# Patient Record
Sex: FEMALE | Race: WHITE | URBAN - METROPOLITAN AREA
[De-identification: names, ages, dates, MRNs, and addresses within clinical notes are randomized per-mention and may not be internally consistent; named-entity substitution may affect disease eponyms.]

---

## 2024-02-09 ENCOUNTER — OFFICE VISIT (OUTPATIENT)
Dept: FAMILY MEDICINE CLINIC | Facility: CLINIC | Age: 59
End: 2024-02-09
Payer: COMMERCIAL

## 2024-02-09 VITALS
DIASTOLIC BLOOD PRESSURE: 84 MMHG | HEART RATE: 81 BPM | OXYGEN SATURATION: 96 % | SYSTOLIC BLOOD PRESSURE: 120 MMHG | HEIGHT: 62 IN | BODY MASS INDEX: 29.44 KG/M2 | WEIGHT: 160 LBS | RESPIRATION RATE: 16 BRPM

## 2024-02-09 DIAGNOSIS — S62.605D CLOSED NONDISPLACED FRACTURE OF PHALANX OF LEFT RING FINGER WITH ROUTINE HEALING, UNSPECIFIED PHALANX, SUBSEQUENT ENCOUNTER: Primary | ICD-10-CM

## 2024-02-09 RX ORDER — CYCLOBENZAPRINE HCL 10 MG
1 TABLET ORAL NIGHTLY
COMMUNITY

## 2024-02-09 RX ORDER — NABUMETONE 750 MG/1
750 TABLET, FILM COATED ORAL 2 TIMES DAILY WITH MEALS
COMMUNITY
Start: 2024-02-02

## 2024-02-09 RX ORDER — ACETAMINOPHEN AND CODEINE PHOSPHATE 300; 30 MG/1; MG/1
1 TABLET ORAL EVERY 4 HOURS PRN
Qty: 30 TABLET | Refills: 0 | Status: SHIPPED | OUTPATIENT
Start: 2024-02-09

## 2024-02-09 RX ORDER — VALACYCLOVIR HYDROCHLORIDE 500 MG/1
TABLET, FILM COATED ORAL
COMMUNITY
Start: 2023-10-03

## 2024-02-09 RX ORDER — IBUPROFEN 800 MG/1
800 TABLET ORAL
COMMUNITY

## 2024-02-09 RX ORDER — LOPERAMIDE HYDROCHLORIDE 2 MG/1
2 CAPSULE ORAL EVERY 2 HOUR PRN
COMMUNITY
Start: 2022-10-19

## 2024-02-09 RX ORDER — MULTIVIT WITH MINERALS/LUTEIN
1000 TABLET ORAL
COMMUNITY

## 2024-02-09 RX ORDER — HYDROCODONE BITARTRATE AND ACETAMINOPHEN 5; 325 MG/1; MG/1
1 TABLET ORAL
COMMUNITY
Start: 2022-07-08

## 2024-02-09 RX ORDER — GABAPENTIN 100 MG/1
CAPSULE ORAL
COMMUNITY
Start: 2024-01-30

## 2024-02-09 RX ORDER — ASCORBIC ACID 500 MG
500 TABLET ORAL 3 TIMES DAILY
COMMUNITY

## 2024-02-09 NOTE — PROGRESS NOTES
Subjective:   Patient ID: Augustina Baugh is a 58 year old female.    HPI  Patient who is new right handed and to practice is brought in by her son for follow up from   Was seen last Friday for finger injury  States her left ring finger was caught up in the dog collar  Seemed like it got dislocated and she was able to pop it back in to place  Went to  and had xray done, no records available in care everywhere  States the xray showed fracture that may require surgery  She was placed in a splint and rx'd nabumetone  She is having 8/10 pain now  Also c/o bruising to the hand and 3 fingers  She has swelling of the lateral hand  She is getting numbness of the ring finger  She can wiggle the fingers slightly but cannot bend them without a lot of pain  Taking tylenol and nabumetone with little relief  Also applying ice    History/Other:   Review of Systems   Constitutional:  Negative for chills, diaphoresis and fever.   Musculoskeletal:  Positive for arthralgias (left ring finger injury, pain, bruising) and joint swelling.   Neurological:  Positive for numbness.     Current Outpatient Medications   Medication Sig Dispense Refill    gabapentin 100 MG Oral Cap       nabumetone 750 MG Oral Tab Take 1 tablet (750 mg total) by mouth 2 (two) times daily with meals.      ascorbic acid 500 MG Oral Tab Take 1 tablet (500 mg total) by mouth 3 (three) times daily.      HYDROcodone-acetaminophen 5-325 MG Oral Tab Take 1 tablet by mouth.      diclofenac 50 MG Oral Tab EC TK 1 T PO BID. SWALLOW WHOLE.      cyclobenzaprine 10 MG Oral Tab Take 1 tablet (10 mg total) by mouth nightly.      ibuprofen 800 MG Oral Tab Take 1 tablet (800 mg total) by mouth.      loperamide 2 MG Oral Cap Take 1 capsule (2 mg total) by mouth every 2 (two) hours as needed.      vitamin E 1000 UNITS Oral Cap Take 1 capsule (1,000 Units total) by mouth.      valACYclovir 500 MG Oral Tab  (Patient not taking: Reported on 2/9/2024)       Allergies:No Known  Allergies    Objective:   Physical Exam  Vitals and nursing note reviewed.   Musculoskeletal:      Left wrist: Normal.      Left hand: Swelling, tenderness and bony tenderness (left 4th and 5th metacarpals) present. Decreased range of motion. Decreased strength of finger abduction and thumb/finger opposition. Decreased sensation (left ring finger). There is disruption of two-point discrimination. Normal capillary refill. Normal pulse.         Assessment & Plan:   1. Closed nondisplaced fracture of phalanx of left ring finger with routine healing, unspecified phalanx, subsequent encounter  Referral to ortho for further evaluation and management. Continue RICE and medications for pain control.   - Ortho Referral - In Network  - acetaminophen-codeine (TYLENOL WITH CODEINE #3) 300-30 MG Oral Tab; Take 1 tablet by mouth every 4 (four) hours as needed for Pain.  Dispense: 30 tablet; Refill: 0

## 2024-02-22 ENCOUNTER — LAB ENCOUNTER (OUTPATIENT)
Dept: LAB | Age: 59
End: 2024-02-22
Attending: PHYSICIAN ASSISTANT
Payer: COMMERCIAL

## 2024-02-22 ENCOUNTER — OFFICE VISIT (OUTPATIENT)
Dept: FAMILY MEDICINE CLINIC | Facility: CLINIC | Age: 59
End: 2024-02-22
Payer: COMMERCIAL

## 2024-02-22 VITALS
SYSTOLIC BLOOD PRESSURE: 126 MMHG | BODY MASS INDEX: 29.63 KG/M2 | RESPIRATION RATE: 16 BRPM | DIASTOLIC BLOOD PRESSURE: 78 MMHG | WEIGHT: 161 LBS | HEIGHT: 62 IN | HEART RATE: 80 BPM

## 2024-02-22 DIAGNOSIS — Z78.0 ENCOUNTER FOR OSTEOPOROSIS SCREENING IN ASYMPTOMATIC POSTMENOPAUSAL PATIENT: ICD-10-CM

## 2024-02-22 DIAGNOSIS — Z12.31 VISIT FOR SCREENING MAMMOGRAM: ICD-10-CM

## 2024-02-22 DIAGNOSIS — Z00.00 ROUTINE GENERAL MEDICAL EXAMINATION AT A HEALTH CARE FACILITY: Primary | ICD-10-CM

## 2024-02-22 DIAGNOSIS — Z00.00 ROUTINE GENERAL MEDICAL EXAMINATION AT A HEALTH CARE FACILITY: ICD-10-CM

## 2024-02-22 DIAGNOSIS — C53.9 MALIGNANT NEOPLASM OF CERVIX, UNSPECIFIED SITE (HCC): ICD-10-CM

## 2024-02-22 DIAGNOSIS — Z23 NEED FOR VACCINATION: ICD-10-CM

## 2024-02-22 DIAGNOSIS — Z13.820 ENCOUNTER FOR OSTEOPOROSIS SCREENING IN ASYMPTOMATIC POSTMENOPAUSAL PATIENT: ICD-10-CM

## 2024-02-22 DIAGNOSIS — S62.605D CLOSED NONDISPLACED FRACTURE OF PHALANX OF LEFT RING FINGER WITH ROUTINE HEALING, UNSPECIFIED PHALANX, SUBSEQUENT ENCOUNTER: ICD-10-CM

## 2024-02-22 DIAGNOSIS — C53.0 MALIGNANT NEOPLASM OF ENDOCERVIX (HCC): ICD-10-CM

## 2024-02-22 DIAGNOSIS — Z12.11 COLON CANCER SCREENING: ICD-10-CM

## 2024-02-22 PROBLEM — B00.9 HSV INFECTION: Status: ACTIVE | Noted: 2023-10-20

## 2024-02-22 PROBLEM — Z90.710 STATUS POST TOTAL ABDOMINAL HYSTERECTOMY AND BILATERAL SALPINGO-OOPHORECTOMY (TAH-BSO): Status: ACTIVE | Noted: 2024-02-22

## 2024-02-22 PROBLEM — Z90.722 STATUS POST TOTAL ABDOMINAL HYSTERECTOMY AND BILATERAL SALPINGO-OOPHORECTOMY (TAH-BSO): Status: ACTIVE | Noted: 2024-02-22

## 2024-02-22 PROBLEM — G89.3 CANCER ASSOCIATED PAIN: Status: ACTIVE | Noted: 2022-07-11

## 2024-02-22 PROBLEM — Z90.79 STATUS POST TOTAL ABDOMINAL HYSTERECTOMY AND BILATERAL SALPINGO-OOPHORECTOMY (TAH-BSO): Status: ACTIVE | Noted: 2024-02-22

## 2024-02-22 LAB
ALBUMIN SERPL-MCNC: 3.8 G/DL (ref 3.4–5)
ALBUMIN/GLOB SERPL: 1.3 {RATIO} (ref 1–2)
ALP LIVER SERPL-CCNC: 100 U/L
ALT SERPL-CCNC: 18 U/L
ANION GAP SERPL CALC-SCNC: 7 MMOL/L (ref 0–18)
AST SERPL-CCNC: 13 U/L (ref 15–37)
BASOPHILS # BLD AUTO: 0.02 X10(3) UL (ref 0–0.2)
BASOPHILS NFR BLD AUTO: 0.6 %
BILIRUB SERPL-MCNC: 0.5 MG/DL (ref 0.1–2)
BUN BLD-MCNC: 19 MG/DL (ref 9–23)
CALCIUM BLD-MCNC: 9.2 MG/DL (ref 8.5–10.1)
CHLORIDE SERPL-SCNC: 109 MMOL/L (ref 98–112)
CHOLEST SERPL-MCNC: 190 MG/DL (ref ?–200)
CO2 SERPL-SCNC: 23 MMOL/L (ref 21–32)
CREAT BLD-MCNC: 0.77 MG/DL
EGFRCR SERPLBLD CKD-EPI 2021: 88 ML/MIN/1.73M2 (ref 60–?)
EOSINOPHIL # BLD AUTO: 0.1 X10(3) UL (ref 0–0.7)
EOSINOPHIL NFR BLD AUTO: 3.1 %
ERYTHROCYTE [DISTWIDTH] IN BLOOD BY AUTOMATED COUNT: 13.6 %
FASTING PATIENT LIPID ANSWER: YES
FASTING STATUS PATIENT QL REPORTED: YES
GLOBULIN PLAS-MCNC: 2.9 G/DL (ref 2.8–4.4)
GLUCOSE BLD-MCNC: 94 MG/DL (ref 70–99)
HCT VFR BLD AUTO: 40.4 %
HDLC SERPL-MCNC: 63 MG/DL (ref 40–59)
HGB BLD-MCNC: 12.5 G/DL
IMM GRANULOCYTES # BLD AUTO: 0.01 X10(3) UL (ref 0–1)
IMM GRANULOCYTES NFR BLD: 0.3 %
LDLC SERPL CALC-MCNC: 101 MG/DL (ref ?–100)
LYMPHOCYTES # BLD AUTO: 0.91 X10(3) UL (ref 1–4)
LYMPHOCYTES NFR BLD AUTO: 28 %
MCH RBC QN AUTO: 24.5 PG (ref 26–34)
MCHC RBC AUTO-ENTMCNC: 30.9 G/DL (ref 31–37)
MCV RBC AUTO: 79.1 FL
MONOCYTES # BLD AUTO: 0.39 X10(3) UL (ref 0.1–1)
MONOCYTES NFR BLD AUTO: 12 %
NEUTROPHILS # BLD AUTO: 1.82 X10 (3) UL (ref 1.5–7.7)
NEUTROPHILS # BLD AUTO: 1.82 X10(3) UL (ref 1.5–7.7)
NEUTROPHILS NFR BLD AUTO: 56 %
NONHDLC SERPL-MCNC: 127 MG/DL (ref ?–130)
OSMOLALITY SERPL CALC.SUM OF ELEC: 290 MOSM/KG (ref 275–295)
PLATELET # BLD AUTO: 155 10(3)UL (ref 150–450)
POTASSIUM SERPL-SCNC: 3.7 MMOL/L (ref 3.5–5.1)
PROT SERPL-MCNC: 6.7 G/DL (ref 6.4–8.2)
RBC # BLD AUTO: 5.11 X10(6)UL
SODIUM SERPL-SCNC: 139 MMOL/L (ref 136–145)
T4 FREE SERPL-MCNC: 1.3 NG/DL (ref 0.8–1.7)
TRIGL SERPL-MCNC: 153 MG/DL (ref 30–149)
TSI SER-ACNC: 5.67 MIU/ML (ref 0.36–3.74)
VIT B12 SERPL-MCNC: 294 PG/ML (ref 193–986)
VIT D+METAB SERPL-MCNC: 27.5 NG/ML (ref 30–100)
VLDLC SERPL CALC-MCNC: 25 MG/DL (ref 0–30)
WBC # BLD AUTO: 3.3 X10(3) UL (ref 4–11)

## 2024-02-22 PROCEDURE — 84443 ASSAY THYROID STIM HORMONE: CPT

## 2024-02-22 PROCEDURE — 85025 COMPLETE CBC W/AUTO DIFF WBC: CPT

## 2024-02-22 PROCEDURE — 82306 VITAMIN D 25 HYDROXY: CPT

## 2024-02-22 PROCEDURE — 84439 ASSAY OF FREE THYROXINE: CPT

## 2024-02-22 PROCEDURE — 80061 LIPID PANEL: CPT

## 2024-02-22 PROCEDURE — 80053 COMPREHEN METABOLIC PANEL: CPT

## 2024-02-22 PROCEDURE — 82607 VITAMIN B-12: CPT

## 2024-02-22 PROCEDURE — 36415 COLL VENOUS BLD VENIPUNCTURE: CPT

## 2024-02-22 RX ORDER — ACETAMINOPHEN AND CODEINE PHOSPHATE 300; 30 MG/1; MG/1
1 TABLET ORAL EVERY 4 HOURS PRN
Qty: 30 TABLET | Refills: 0 | Status: SHIPPED | OUTPATIENT
Start: 2024-02-22

## 2024-02-22 NOTE — PROGRESS NOTES
Subjective:   Patient ID: Augustina Baugh is a 58 year old female.    HPI  Patient presents today to for physical.      PMHx: cervical cancer s/o NITO sees oncology and rad/onc  cancer center every 3 months, PET scan upcoming 4/2024, plans for repeat cytology 1/2025. 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 and sees dermatology  PSHx: reviewed  FHx: reviewed    Medications: reviewed   Allergies: NKDA     Diet: could be better, gained some weight after chemo and radiation  Exercise: biking  Tobacco use: denies  Drug use: denies  Alcohol use: denies  LMP: NITO  Marital status: , 2 children  Occupation: disability    Health maintenance:  Pap/Hpv: NITO due to cervical cancer  Mammogram: due  Performs SBEs: yes  Dexa scan: never  Colonoscopy: never  Discussed age appropriate vaccines    H/o cholelithiasis seen on previous scans  Occasional RUQ pain but manageable right now        History/Other:   Review of Systems   Constitutional:  Negative for chills, fatigue and fever.   HENT:  Negative for congestion, ear pain, rhinorrhea and sore throat.    Eyes:  Negative for visual disturbance.   Respiratory:  Negative for cough, shortness of breath and wheezing.    Cardiovascular:  Negative for chest pain, palpitations and leg swelling.   Gastrointestinal:  Negative for abdominal pain, diarrhea, nausea and vomiting.   Genitourinary:  Negative for dysuria, frequency and hematuria.   Musculoskeletal:  Positive for arthralgias (left hand pain 2/2 fracture). Negative for gait problem and myalgias.   Skin:  Negative for rash.   Neurological:  Negative for weakness, light-headedness and headaches.   Hematological:  Negative for adenopathy.   Psychiatric/Behavioral:  Negative for dysphoric mood. The patient is not nervous/anxious.      Current Outpatient Medications   Medication Sig Dispense Refill    gabapentin 100 MG Oral Cap       nabumetone 750 MG Oral Tab Take 1 tablet (750 mg total) by mouth 2 (two) times daily with meals.       valACYclovir 500 MG Oral Tab  (Patient not taking: Reported on 2/9/2024)      ascorbic acid 500 MG Oral Tab Take 1 tablet (500 mg total) by mouth 3 (three) times daily.      HYDROcodone-acetaminophen 5-325 MG Oral Tab Take 1 tablet by mouth.      diclofenac 50 MG Oral Tab EC TK 1 T PO BID. SWALLOW WHOLE.      cyclobenzaprine 10 MG Oral Tab Take 1 tablet (10 mg total) by mouth nightly.      ibuprofen 800 MG Oral Tab Take 1 tablet (800 mg total) by mouth.      loperamide 2 MG Oral Cap Take 1 capsule (2 mg total) by mouth every 2 (two) hours as needed.      vitamin E 1000 UNITS Oral Cap Take 1 capsule (1,000 Units total) by mouth.      acetaminophen-codeine (TYLENOL WITH CODEINE #3) 300-30 MG Oral Tab Take 1 tablet by mouth every 4 (four) hours as needed for Pain. 30 tablet 0     Allergies:No Known Allergies    Objective:   Physical Exam  Vitals and nursing note reviewed.   Constitutional:       General: She is not in acute distress.     Appearance: Normal appearance. She is well-developed.   HENT:      Head: Normocephalic and atraumatic.      Right Ear: Tympanic membrane, ear canal and external ear normal.      Left Ear: Tympanic membrane, ear canal and external ear normal.      Nose: Nose normal.      Mouth/Throat:      Mouth: Mucous membranes are moist.   Eyes:      Extraocular Movements: Extraocular movements intact.      Conjunctiva/sclera: Conjunctivae normal.      Pupils: Pupils are equal, round, and reactive to light.   Neck:      Thyroid: No thyromegaly.   Cardiovascular:      Rate and Rhythm: Normal rate and regular rhythm.      Pulses: Normal pulses.      Heart sounds: Normal heart sounds.   Pulmonary:      Effort: Pulmonary effort is normal.      Breath sounds: Normal breath sounds. No wheezing or rales.   Abdominal:      General: Bowel sounds are normal. There is no distension.      Palpations: Abdomen is soft. There is no mass.      Tenderness: There is no abdominal tenderness.   Musculoskeletal:          General: No tenderness. Normal range of motion.      Left wrist: Normal.      Left hand: Swelling and bony tenderness (left 4th and 5th metacarpals) present. Decreased strength of finger abduction and thumb/finger opposition. Decreased sensation (left ring finger). There is disruption of two-point discrimination. Normal capillary refill. Normal pulse.      Cervical back: Normal range of motion and neck supple.   Lymphadenopathy:      Cervical: No cervical adenopathy.   Skin:     General: Skin is warm and dry.      Findings: No rash.   Neurological:      General: No focal deficit present.      Mental Status: She is alert and oriented to person, place, and time.   Psychiatric:         Mood and Affect: Mood normal.         Behavior: Behavior normal.         Assessment & Plan:   1. Routine general medical examination at a health care facility  Patient is generally healthy.  Physical exam is unremarkable.  Check fasting labs.  Health maintenance issues discussed. Encouraged routine vaccines.  Advised healthy diet and regular exercise.  Annual physicals.  - CBC With Differential With Platelet; Future  - Comp Metabolic Panel (14); Future  - Lipid Panel; Future  - TSH W Reflex To Free T4; Future  - Vitamin B12; Future  - Vitamin D; Future    2. Closed nondisplaced fracture of phalanx of left ring finger with routine healing, unspecified phalanx, subsequent encounter  Follow up with ortho as previously discussed. Continue tylenol #3 prn pain.   - acetaminophen-codeine (TYLENOL WITH CODEINE #3) 300-30 MG Oral Tab; Take 1 tablet by mouth every 4 (four) hours as needed for Pain.  Dispense: 30 tablet; Refill: 0    3. Visit for screening mammogram  - [facility] MILLER 2D+3D SCREENING BILAT (CPT=77067/83454); Future    4. Colon cancer screening  - Surgery Referral - In Network    5. Encounter for osteoporosis screening in asymptomatic postmenopausal patient  - XR DEXA BONE DENSITOMETRY (CPT=77080); Future    6. Need for vaccination  - TdaP  (Boostrix/Adacel) Vaccine (> 7 Y)  - Shingrix (Shingles recombinant) Immunization (50 and older)    7. Malignant neoplasm of endocervix (HCC)  8. Malignant neoplasm of cervix, unspecified site (HCC)  S/p TAHBSO and ongoing follow up with gyne and rad/onc.

## 2024-02-28 ENCOUNTER — HOSPITAL ENCOUNTER (OUTPATIENT)
Dept: GENERAL RADIOLOGY | Age: 59
Discharge: HOME | End: 2024-02-28
Attending: PHYSICIAN ASSISTANT
Payer: COMMERCIAL

## 2024-02-28 ENCOUNTER — OFFICE VISIT (OUTPATIENT)
Dept: ORTHOPEDICS CLINIC | Facility: CLINIC | Age: 59
End: 2024-02-28
Payer: COMMERCIAL

## 2024-02-28 VITALS — HEIGHT: 62 IN | BODY MASS INDEX: 29.63 KG/M2 | WEIGHT: 161 LBS

## 2024-02-28 DIAGNOSIS — M79.642 LEFT HAND PAIN: ICD-10-CM

## 2024-02-28 DIAGNOSIS — M79.642 LEFT HAND PAIN: Primary | ICD-10-CM

## 2024-02-28 PROCEDURE — 3008F BODY MASS INDEX DOCD: CPT | Performed by: PHYSICIAN ASSISTANT

## 2024-02-28 PROCEDURE — 99213 OFFICE O/P EST LOW 20 MIN: CPT | Performed by: PHYSICIAN ASSISTANT

## 2024-02-28 PROCEDURE — 73130 X-RAY EXAM OF HAND: CPT | Performed by: PHYSICIAN ASSISTANT

## 2024-02-28 NOTE — H&P
Clinic Note [facility]     Assessment/Plan:  58 year old female    Left hand left ring finger proximal phalanx fracture, minimally displaced-no malrotation on exam she is already 3 and half weeks out.  Would recommend conservative management.  She will continue with the brace and take it off for light range of motion exercises and begin therapy.  No heavy lifting or strengthening for an additional 3 weeks.  I will see her back at that point for repeat x-ray.      ICD-10-CM    1. Left hand pain  M79.642 XR HAND (MIN 3 VIEWS), LEFT (CPT=73130)           Follow Up: 3 weeks with x-rays     Diagnostic Studies:  X-rays are negative for acute fractures occasions of    Physical Exam:    Ht 5' 2\" (1.575 m)   Wt 161 lb (73 kg)   LMP  (LMP Unknown)   BMI 29.45 kg/m²     Constitutional: NAD. AOx3. Well-developed and Well-nourished.   Psychiatric: Normal mood/ affect/ behavior. Judgment and thought content normal.     Left upper Extremity:   Inspection: Skin Intact. No skin lesions. No gross deformity.   Palpation: Tender palpation of the left ring finger at the proximal phalanx, no malrotation on exam   Motion: Elbow: normal bilateral symmetric ext/flex  Wrist: normal bilateral symmetric ext/flex/sup/pro  Finger: full composite fist, PIP extension/flexion 20/70       CC: Hand Injury (LT RING FINGER FX; FINGER STUCK IN DOG COLLAR; 2/2/24)        HPI: This 58 year old female presents with complaints of left ring finger pain.  She states on 2/2/2024 her finger stuck in a dog collar and her finger went the opposite direction she pushed it back in place.  Her pain is improved since then.  She is been an ulnar gutter brace.  She is here for further evaluation    No past medical history on file.  No past surgical history on file.  Current Outpatient Medications   Medication Sig Dispense Refill    acetaminophen-codeine (TYLENOL WITH CODEINE #3) 300-30 MG Oral Tab Take 1 tablet by mouth every 4 (four) hours as needed for Pain.  30 tablet 0    gabapentin 100 MG Oral Cap       nabumetone 750 MG Oral Tab Take 1 tablet (750 mg total) by mouth 2 (two) times daily with meals.      valACYclovir 500 MG Oral Tab       ascorbic acid 500 MG Oral Tab Take 1 tablet (500 mg total) by mouth 3 (three) times daily.      diclofenac 50 MG Oral Tab EC TK 1 T PO BID. SWALLOW WHOLE.      cyclobenzaprine 10 MG Oral Tab Take 1 tablet (10 mg total) by mouth nightly.      ibuprofen 800 MG Oral Tab Take 1 tablet (800 mg total) by mouth.      loperamide 2 MG Oral Cap Take 1 capsule (2 mg total) by mouth every 2 (two) hours as needed.      vitamin E 1000 UNITS Oral Cap Take 1 capsule (1,000 Units total) by mouth.      HYDROcodone-acetaminophen 5-325 MG Oral Tab Take 1 tablet by mouth. (Patient not taking: Reported on 2/28/2024)       No Known Allergies  No family history on file.  Social History     Occupational History    Not on file   Tobacco Use    Smoking status: Never    Smokeless tobacco: Never   Vaping Use    Vaping Use: Never used   Substance and Sexual Activity    Alcohol use: Not Currently    Drug use: Never    Sexual activity: Not Currently        Review of Systems (negative unless bolded):  General: fevers, chills, fatigue  CV:  chest pain, palpitations, leg swelling  Msk: bodyaches, neck pain, neck stiffness  Skin: rashes, open wounds, nonhealing ulcers  Hem: bleeds easily, bruise easily, immunocompromised  Neuro: dizziness, light headedness, headaches  Psych: anxious, depressed, anger issues      Holly Nj PA-C  Hand, Wrist, & Elbow Surgery  Physician Assistant to Dr. Wade Enriquez  [facility]  [address] [place] [address], [address], [email or web address]  [email or web address]  t: [phone]  f: [phone]

## 2024-03-04 ENCOUNTER — HOSPITAL ENCOUNTER (OUTPATIENT)
Dept: BONE DENSITY | Age: 59
Discharge: HOME | End: 2024-03-04
Attending: PHYSICIAN ASSISTANT
Payer: COMMERCIAL

## 2024-03-04 ENCOUNTER — TELEPHONE (OUTPATIENT)
Dept: OCCUPATIONAL MEDICINE | Age: 59
End: 2024-03-04

## 2024-03-04 ENCOUNTER — TELEPHONE (OUTPATIENT)
Dept: ORTHOPEDICS CLINIC | Facility: CLINIC | Age: 59
End: 2024-03-04

## 2024-03-04 DIAGNOSIS — S62.605A CLOSED DISPLACED FRACTURE OF PHALANX OF LEFT RING FINGER, UNSPECIFIED PHALANX, INITIAL ENCOUNTER: Primary | ICD-10-CM

## 2024-03-04 DIAGNOSIS — Z78.0 ENCOUNTER FOR OSTEOPOROSIS SCREENING IN ASYMPTOMATIC POSTMENOPAUSAL PATIENT: ICD-10-CM

## 2024-03-04 DIAGNOSIS — Z13.820 ENCOUNTER FOR OSTEOPOROSIS SCREENING IN ASYMPTOMATIC POSTMENOPAUSAL PATIENT: ICD-10-CM

## 2024-03-04 PROCEDURE — 77080 DXA BONE DENSITY AXIAL: CPT | Performed by: PHYSICIAN ASSISTANT

## 2024-03-04 NOTE — TELEPHONE ENCOUNTER
Patient's daughter is requesting OT orders.  Orders pending.  Please advise. Thank you!    LOV Left hand left ring finger proximal phalanx fracture, minimally displaced-no malrotation on exam she is already 3 and half weeks out. Would recommend conservative management. She will continue with the brace and take it off for light range of motion exercises and begin therapy. No heavy lifting or strengthening for an additional 3 weeks. I will see her back at that point for repeat x-ray.

## 2024-03-05 NOTE — TELEPHONE ENCOUNTER
Spoke with Daughter Anastasia to let her know the OT order is in.  She has already been in contact with Joceline RAYA in [place].  No other questions or concerns.

## 2024-03-08 ENCOUNTER — TELEPHONE (OUTPATIENT)
Dept: PHYSICAL THERAPY | Facility: HOSPITAL | Age: 59
End: 2024-03-08

## 2024-03-08 NOTE — PROGRESS NOTES
OCCUPATIONAL THERAPY UPPER EXTREMITY EVALUATION   Referring Provider: MARLEY Salinas  Diagnosis:     Closed displaced fracture of phalanx of left ring finger, unspecified phalanx, initial encounter (S62.603N)    Orders:     Order Date:  3/5/2024  Authorizing Provider:   LAURA KOWALSKI     Procedure:  [facility] REFERRAL TO [facility] [[id number]]         Order #:   [id number]  Qty:  1     Priority:  Routine                   Class:   IHP - RFL     Standing Interval:          Standing Occurrences:          Expires on:            Expected by:    Associated DX:  Closed displaced fracture of phalanx of left ring finger, unspecified phalanx, initial encounter (S62.603M)     Order summary:  IHP - RFL, Routine, 8 visits  Occupational  Therapy Area of Concentration: Orthopedic  Occupational Therapy Ortho: UE  If the patient can be seen sooner with a PT vs an OT, can we cancel this order and replace with a PT order? No         Comments:  Gentle rom , no strengthening  2x/week 6 weeks    SUBJECTIVE:  Augustina Baugh is a 58 year old female who presents to therapy today with complaints of left hand swelling and stiffness; received wearing brace  Pt describes pain level current 0/10, at best 0/10, at worst 5-7/10.   Current functional limitations include bathing, dressing, food prep.    Augustina describes prior level of function independent/no limitations.  Employment: Unemployed  Hand Dominance: right  Living Situation: Family  HPI: dog pulled hand  Imaging/Tests: xrays:Oblique fracture involving the mid shaft left 4th digit proximal phalanx with slight lateral displacement distal component  Pt goals include full use of hand.  Past medical history was reviewed with Augustina. Significant findings: No past medical history on file.        Precautions: None    OBJECTIVE:    OBSERVATION: Unremarkable     OUTCOME MEASURE   On Initial Evaluation:  QuickDASH Outcome Score  Score: 70.45 % (3/11/2024  6:15  PM)        ORTHOTICS: using pre-kevin ulnar gutter splint        SENSORY: Tingling: Yes, LRF    Wrist AROM: extension 60, flexion 45    Date:  3/11/24   Edema Girth measurements (cm)  Right/Left  left   Digit: RF  7.5  7.1  5.5  4.8  4.7     DATE: 3/11/24 Left  Digital AROM (°) Index Finger Middle Finger Ring Finger Small Finger   MP Ext/Flex 0/55 0/70 0/40 0/60   PIP Ext/Flex 0/95 0/90 15/65 0/60   DIP Ext/Flex 0/30 0/50 0/30 0/45   WHITT 180 210 120 165        Strength (lbs)  Right/Left Right  3/11/24 Left   3/11/24    NT NT   3 Point Pinch  NT NT   Lateral Pinch NT NT   NT= Not tested due to acuity           Occupational profile: history and experiences, patterns of daily living, interests, values and needs:    Patient's expressed concerns about performing occupations and daily life on initial eval:     Occupational roles affected by referring diagnosis on initial eval:     Homemaker:  limited   Leisure: gardening: Unable to perform   Musician: N/a     Crafter: juan luis, jewelry making:  limited   Cook/: limited       ASSESSMENT  Augustina presents to occupational therapy evaluation with primary c/o left hand stiffness, swelling and pain. The results of the objective tests and measures show the physical, cognitive and/or psychosocial skills affected in the summary below, including specifically of note edema, decreased ROM, pain.  Functional deficits include but are not limited to jewelrymaking with seed beads, food prep, bathing, dressing.  Signs and symptoms are consistent with diagnosis of RF PP fx. Patient and OT discussed evaluation findings, pathology, POC and HEP.  Patient voiced understanding and performs HEP correctly without reported pain. Skilled Occupational Therapy is medically necessary to address the above impairments and reach functional goals.     Occupations as identified above (representing ADL's and IADL's, Education, Work, Leisure, and Social Participation) and the following  component areas:    ---Physical skills affected by referring diagnosis: Pain, Decreased range of motion, Fine motor coordination, Gross motor coordination,presumed Decreased strength, edema, Impaired/decreased sensation in the left hand.    ---Cognitive skills affected by referring diagnosis: None    ---Psychosocial skills affected by referring diagnosis:  Interpersonal interactions, Habits, Routines, Use of coping strategies.    The Occupational Therapy Evaluation code of 88696 Low Complex was selected based on the followin. Chart Review: A brief chart review indicating low complexity was performed.  Occupational profile: the following were assessed: presenting problems, reasons for referral, occupational history, patterns of daily living, interests, values, needs, client's goals/concerns about performing occupations and daily life skills.  Medical and therapy history review: PLF identified, review of medical records.    2. Assessment of Occupational Performance: (see above summary of performance deficits identified; levels of assessment used) moderate complexity (3-5 performance deficits relating to physical, cognitive, or psychosocial skills).    3. Clinical decision-making: includes the assessment process, comorbidities (additional diseases/conditions/disorders, socioeconomic, cultural, environmental, client behavior characteristics) such as none noted , the assessment of modification and need for assistance such as none noted, selection of interventions such as Manual Therapy, Neuromuscular Re-education, Self-Care Home Management, Therapeutic Activities, Therapeutic Exercise, Home Exercise Program instruction, Modalities to include: Ultrasound and FT, hot packs, paraffin, and taping  and  custom splinting, plan of care (intervention strategies, specialized skills, outcome goals), indicating a low complexity: analysis of data from problem-focused assessment.    These deficits impair the patient's ability  to participate in ADLs, IADLs, and meaningful occupational tasks.  Reduced participation in meaningful occupational tasks may increase feelings of sadness and isolation.    Today’s Treatment and Response:   Treatment provided today in addition to the initial evaluation:   Date 3/11/24       Visit # 1       # of visits authorized: HMO 5     # of visits in OT POC:  10     Evaluation Initial X     Progress Report written        Manual Therapy                                     Ther ex          tendon glides x       Finger add/abd x       Fluidotherapy Fluidotherapy for 10 minutes to left hand, with AROM x digits performed.                                                          HEP/  Patient education topics See HEP notes below; issued Large Digi sleeve       Therapeutic Activity        In-hand manipulation activities                                          Self care training       Objective measurements taken and reviewed with patient  See above       Neuromuscular Re-education                                   Orthotic fitting and training      Taping      Modalities      X= performed this date as previously outlined    HEP  On initial eval, patient education was provided on exam findings, treatment diagnosis, treatment plan, expectations, and prognosis. Patient was also provided recommendations for use of heat.    HEP Date Issued Date modified Date discharged Comments    Tendon glides 3/11/24      Finger add/abd 3/11/24      Towel scrunches 3/11/24      Finger opp 3/11/24                 Goals: (to be met in 10 visits)  Patient will be independent and compliant with comprehensive HEP to maintain progress achieved in OT.  Patient will present with increase in left digit 2-5 WHITT to 200 to allow for ease with gripping toothbrush.  Patient will report no more than 1/10 pain in left hand during self care activities.  Patient will present with  strength in the left hand to that of 40% of the contralateral hand in  order to be able to perform housework.  Patient will present with improved FMC in the left hand sufficient for crafting.    PLAN:  Frequency / Duration: Patient will be seen for 2 x/week or a total of 10 visits over a 90 day period.  Treatment will include: Manual Therapy, Neuromuscular Re-education, Self-Care Home Management, Therapeutic Activities, Therapeutic Exercise, Home Exercise Program instruction, Modalities to include: Ultrasound and FT, and paraffin  taping, and custom splinting.    Next session: FT, cotton ball mix, cone finger wraps, graded pen ; consider finger foam Jeannie wedges    Education or treatment limitation: None  Rehab Potential:good for stated goals    Patient was  advised of these findings, precautions, and treatment options and has agreed to actively participate in planning and for this course of care.    Charges: OT Eval: Low Complexity, 1 TE  Total Timed Treatment: 20 minutes     Total Treatment Time: 45 minutes      DANAE Rene/L S CHT  Physician's certification required: Yes  I certify the need for these services furnished under this plan of treatment and while under my care. (electronic signature)    X___________________________________________________ Date____________________    Certification From: 3/11/2024  To:6/10/2024

## 2024-03-11 ENCOUNTER — OFFICE VISIT (OUTPATIENT)
Dept: OCCUPATIONAL MEDICINE | Age: 59
End: 2024-03-11
Attending: PHYSICIAN ASSISTANT
Payer: COMMERCIAL

## 2024-03-11 DIAGNOSIS — S62.605A CLOSED DISPLACED FRACTURE OF PHALANX OF LEFT RING FINGER, UNSPECIFIED PHALANX, INITIAL ENCOUNTER: Primary | ICD-10-CM

## 2024-03-11 PROCEDURE — 97110 THERAPEUTIC EXERCISES: CPT

## 2024-03-11 PROCEDURE — 97165 OT EVAL LOW COMPLEX 30 MIN: CPT

## 2024-03-12 ENCOUNTER — TELEPHONE (OUTPATIENT)
Dept: PHYSICAL THERAPY | Facility: HOSPITAL | Age: 59
End: 2024-03-12

## 2024-03-12 NOTE — PROGRESS NOTES
Diagnosis:        Closed displaced fracture of phalanx of left ring finger, unspecified phalanx, initial encounter (S62.605A) Referring Provider: David  Date of Evaluation:    3/11/24    Precautions:  None per orders Next MD/PA visit: 3/27/24  DOI: 2/4/24  Date of Surgery: n/a   Insurance Primary/Secondary: [employer] [place] HMO / N/A     # Auth Visits: n/a            Orders:     Order Date:  3/5/2024  Authorizing Provider:   LAURA KOWALSKI     Procedure:  OP REFERRAL TO [facility] [[id number]]         Order #:   [id number]  Qty:  1     Priority:  Routine                   Class:   IHP - RFL     Standing Interval:          Standing Occurrences:          Expires on:            Expected by:    Associated DX:  Closed displaced fracture of phalanx of left ring finger, unspecified phalanx, initial encounter (S62.605A)     Order summary:  IHP - RFL, Routine, 8 visits  Occupational  Therapy Area of Concentration: Orthopedic  Occupational Therapy Ortho: UE  If the patient can be seen sooner with a PT vs an OT, can we cancel this order and replace with a PT order? No         Comments:  Gentle rom , no strengthening  2x/week 6 weeks    SUBJECTIVE:  \"Better.\"    Pain 3/10      OBJECTIVE:        OUTCOME MEASURE   On Initial Evaluation:  QuickDASH Outcome Score  Score: 70.45 % (3/11/2024  6:15 PM)              SENSORY: Tingling: Yes, LRF    Wrist AROM: extension 60, flexion 45    Date:  3/11/24   Edema Girth measurements (cm)  Right/Left  left   Digit: RF  7.5  7.1  5.5  4.8  4.7     DATE: 3/11/24 Left  Digital AROM (°) Index Finger Middle Finger Ring Finger Small Finger   MP Ext/Flex 0/55 0/70 0/40 0/60   PIP Ext/Flex 0/95 0/90 15/65 0/60   DIP Ext/Flex 0/30 0/50 0/30 0/45   WHITT 180 210 120 165        Strength (lbs)  Right/Left Right  3/11/24 Left   3/11/24    NT NT   3 Point Pinch  NT NT   Lateral Pinch NT NT   NT= Not tested due to acuity             Visit # 1  2     # of visits authorized: HMO 5 5    # of  visits in OT POC:  10 10    Evaluation Initial X     Progress Report written        Manual Therapy         MEM  x      IASTM   x               Ther ex          tendon glides x  x     Finger add/abd x  x     Fluidotherapy Fluidotherapy for 10 minutes to left hand, with AROM x digits performed.   Fluidotherapy for 10 minutes to left hand, with AROM x digits performed.                                                      HEP/  Patient education topics See HEP notes below; issued Large Digi sleeve  issued pink sponge for in-hand manipulation; pink Jeannie finger wedges issued     Therapeutic Activity        In-hand manipulation activities  Sponge, cotton ball mix, card deck    Graded grasp  Pen grasps, cones, yarn twisting                                  Self care training       Objective measurements taken and reviewed with patient  See above       Neuromuscular Re-education                                   Orthotic fitting and training      Taping      Modalities      X= performed this date as previously outlined    HEP  On initial eval, patient education was provided on exam findings, treatment diagnosis, treatment plan, expectations, and prognosis. Patient was also provided recommendations for use of heat.    HEP Date Issued Date modified Date discharged Comments    Tendon glides 3/11/24      Finger add/abd 3/11/24      Towel scrunches 3/11/24      Finger opp 3/11/24                   ASSESSMENT  Patient presents with reduced but still evident and impacting on digital motion.  Rotational component of the RF should not limit her once the edema reduces and she is able to flex the digit better.  She might benefit from abby strap of RF to MF to assist with digital motion.    Goals: (to be met in 10 visits)  Patient will be independent and compliant with comprehensive HEP to maintain progress achieved in OT.  Patient will present with increase in left digit 2-5 WHITT to 200 to allow for ease with gripping  toothbrush.  Patient will report no more than 1/10 pain in left hand during self care activities.  Patient will present with  strength in the left hand to that of 40% of the contralateral hand in order to be able to perform housework.  Patient will present with improved FMC in the left hand sufficient for crafting.    PLAN:  Frequency / Duration: Patient will be seen for 2 x/week or a total of 10 visits over a 90 day period.  Treatment will include: Manual Therapy, Neuromuscular Re-education, Self-Care Home Management, Therapeutic Activities, Therapeutic Exercise, Home Exercise Program instruction, Modalities to include: Ultrasound and FT, and paraffin  taping, and custom splinting.    Next session: FT, cotton ball mix,  large beading, small balls, consider abby taping RF to  for HUGO    Charges: 1 MT, 1 TE, 1 TA   Total Timed Treatment: 45 minutes    Total Treatment Time: 45 minutes  WELLINGTON Rene, OTR/L, CHT

## 2024-03-15 ENCOUNTER — OFFICE VISIT (OUTPATIENT)
Dept: OCCUPATIONAL MEDICINE | Age: 59
End: 2024-03-15
Attending: PHYSICIAN ASSISTANT
Payer: COMMERCIAL

## 2024-03-15 PROCEDURE — 97530 THERAPEUTIC ACTIVITIES: CPT

## 2024-03-15 PROCEDURE — 97140 MANUAL THERAPY 1/> REGIONS: CPT

## 2024-03-15 PROCEDURE — 97110 THERAPEUTIC EXERCISES: CPT

## 2024-03-15 NOTE — PROGRESS NOTES
Diagnosis:        Closed displaced fracture of phalanx of left ring finger, unspecified phalanx, initial encounter (S62.605A) Referring Provider: David  Date of Evaluation:    3/11/24    Precautions:  None per orders Next MD/PA visit: 3/27/24  DOI: 2/4/24  Date of Surgery: n/a   Insurance Primary/Secondary: [employer] H[place] / N/A     # Auth Visits: n/a            Orders:     Order Date:  3/5/2024  Authorizing Provider:   LAURA KOWALSKI     Procedure:  OP REFERRAL TO [facility] [[id number]]         Order #:   [id number]  Qty:  1     Priority:  Routine                   Class:   IHP - RFL     Standing Interval:          Standing Occurrences:          Expires on:            Expected by:    Associated DX:  Closed displaced fracture of phalanx of left ring finger, unspecified phalanx, initial encounter (S62.605A)     Order summary:  IHP - RFL, Routine, 8 visits  Occupational  Therapy Area of Concentration: Orthopedic  Occupational Therapy Ortho: UE  If the patient can be seen sooner with a PT vs an OT, can we cancel this order and replace with a PT order? No         Comments:  Gentle rom , no strengthening  2x/week 6 weeks    SUBJECTIVE:  Reports not putting on splint today.  \"Do I need it?\"    Pain 0/10      OBJECTIVE:        OUTCOME MEASURE   On Initial Evaluation:  QuickDASH Outcome Score  Score: 70.45 % (3/11/2024  6:15 PM)              SENSORY: Tingling: Yes, LRF    Wrist AROM: extension 60, flexion 45    Date:  3/11/24   Edema Girth measurements (cm)  Right/Left  left   Digit: RF  7.5  7.1  5.5  4.8  4.7     DATE: 3/11/24 Left  Digital AROM (°) Index Finger Middle Finger Ring Finger Small Finger   MP Ext/Flex 0/55 0/70 0/40 0/60   PIP Ext/Flex 0/95 0/90 15/65 0/60   DIP Ext/Flex 0/30 0/50 0/30 0/45   WHITT 180 210 120 165     Date: 3/20/24  LEFT  Digital AROM (°)  Index Finger  Middle Finger  Ring Finger  Small Finger    MP Ext/Flex  0/70 0/75 0/55 0/70   PIP Ext/Flex  0/100 0/95 15/80 0/75   DIP  Ext/Flex  0/65 0/60 +5/45 0/65   WHITT  235 230 160 210          Strength (lbs)  Right/Left Right  3/11/24 Left   3/11/24    NT NT   3 Point Pinch  NT NT   Lateral Pinch NT NT   NT= Not tested due to acuity             Visit # 1  2  3   # of visits authorized: HMO 5 5 5   # of visits in OT POC:  10 10 10   Evaluation Initial X     Progress Report written        Manual Therapy         MEM  x      IASTM   x               Ther ex          tendon glides x  x  x   Finger add/abd x  x  x   Fluidotherapy Fluidotherapy for 10 minutes to left hand, with AROM x digits performed.   Fluidotherapy for 10 minutes to left hand, with AROM x digits performed. Fluidotherapy for 10 minutes to left hand, with AROM x digits performed.    reverse blocking          intrinsic activation                                      HEP/  Patient education topics See HEP notes below; issued Large Digi sleeve  issued pink sponge for in-hand manipulation; pink Jeannie finger wedges issued  wear splint when outside especially until PA discharges it.  Issued abby straps for RF to MF.   Therapeutic Activity        In-hand manipulation activities  Sponge, cotton ball mix, card deck Large beads, small balls, deck of cards, dog toy, cotton ball mix, pen/marker rolls, small sponge pieces   Graded grasp  Pen grasps, cones, yarn twisting Rice marble sifting                                 Self care training       Objective measurements taken and reviewed with patient  See above    see above   Neuromuscular Re-education                                   Orthotic fitting and training      Taping   Rock tape applied in modified finger web to dorsum of LRF   Modalities      X= performed this date as previously outlined    HEP  On initial eval, patient education was provided on exam findings, treatment diagnosis, treatment plan, expectations, and prognosis. Patient was also provided recommendations for use of heat.    HEP Date Issued Date modified Date  discharged Comments    Tendon glides 3/11/24      Finger add/abd 3/11/24      Towel scrunches 3/11/24      Finger opp 3/11/24                   ASSESSMENT  Patient presents with improved ROM in all digits 2-5 in the left hand.  RF PIPJ extension is unchanged, but composite flexion has improved as noted above.    Goals: (to be met in 10 visits)  Patient will be independent and compliant with comprehensive HEP to maintain progress achieved in OT. (Progressing)    Patient will present with increase in left digit 2-5 WHITT to 200 to allow for ease with gripping toothbrush. (Progressing)  Patient will report no more than 1/10 pain in left hand during self care activities. (Progressing)  Patient will present with  strength in the left hand to that of 40% of the contralateral hand in order to be able to perform housework. (Progressing)  Patient will present with improved FMC in the left hand sufficient for crafting.(Progressing)    PLAN:  Frequency / Duration: Patient will be seen for 2 x/week or a total of 10 visits over a 90 day period.  Treatment will include: Manual Therapy, Neuromuscular Re-education, Self-Care Home Management, Therapeutic Activities, Therapeutic Exercise, Home Exercise Program instruction, Modalities to include: Ultrasound and FT, and paraffin  taping, and custom splinting.    Next session: FT, cotton ball mix, medium beading, small balls; cotton ball flicks, reverse blocking, intrinsic activation with cotton ball mix    Charges:  1 TE, 2 TA   Total Timed Treatment: 45 minutes    Total Treatment Time: 45 minutes  WELLINGTON Rene, OTR/L, CHT

## 2024-03-20 ENCOUNTER — OFFICE VISIT (OUTPATIENT)
Dept: OCCUPATIONAL MEDICINE | Age: 59
End: 2024-03-20
Attending: PHYSICIAN ASSISTANT
Payer: COMMERCIAL

## 2024-03-20 PROCEDURE — 97530 THERAPEUTIC ACTIVITIES: CPT

## 2024-03-20 PROCEDURE — 97110 THERAPEUTIC EXERCISES: CPT

## 2024-03-20 NOTE — PROGRESS NOTES
Diagnosis:        Closed displaced fracture of phalanx of left ring finger, unspecified phalanx, initial encounter (S62.605A) Referring Provider: David  Date of Evaluation:    3/11/24    Precautions:  None per orders Next MD/PA visit: 3/27/24  DOI: 2/4/24  Date of Surgery: n/a   Insurance Primary/Secondary: [employer] / N/A     # Auth Visits: n/a            Orders:     Order Date:  3/5/2024  Authorizing Provider:   LAURA KOWALSKI     Procedure:  OP REFERRAL TO [facility] [[id number]]         Order #:   [id number]  Qty:  1     Priority:  Routine                   Class:   IHP - RFL     Standing Interval:          Standing Occurrences:          Expires on:            Expected by:    Associated DX:  Closed displaced fracture of phalanx of left ring finger, unspecified phalanx, initial encounter (S62.605A)     Order summary:  IHP - RFL, Routine, 8 visits  Occupational  Therapy Area of Concentration: Orthopedic  Occupational Therapy Ortho: UE  If the patient can be seen sooner with a PT vs an OT, can we cancel this order and replace with a PT order? No         Comments:  Gentle rom , no strengthening  2x/week 6 weeks    SUBJECTIVE:  \"Good\"  Reports using abby straps at home with exercises, wearing splint upon arrival today.    Pain 0/10      OBJECTIVE:        OUTCOME MEASURE   On Initial Evaluation:  QuickDASH Outcome Score  Score: 70.45 % (3/11/2024  6:15 PM)              SENSORY: Tingling: Yes, LRF    Wrist AROM: extension 60, flexion 45    Date:  3/11/24   Edema Girth measurements (cm)  Right/Left  left   Digit: RF  7.5  7.1  5.5  4.8  4.7     DATE: 3/11/24 Left  Digital AROM (°) Index Finger Middle Finger Ring Finger Small Finger   MP Ext/Flex 0/55 0/70 0/40 0/60   PIP Ext/Flex 0/95 0/90 15/65 0/60   DIP Ext/Flex 0/30 0/50 0/30 0/45   WHITT 180 210 120 165     Date: 3/20/24  LEFT  Digital AROM (°)  Index Finger  Middle Finger  Ring Finger  Small Finger    MP Ext/Flex  0/70 0/75 0/55 0/70   PIP  Ext/Flex  0/100 0/95 15/80 0/75   DIP Ext/Flex  0/65 0/60 +5/45 0/65   WHITT  235 230 160 210          Strength (lbs)  Right/Left Right  3/11/24 Left   3/11/24    NT NT   3 Point Pinch  NT NT   Lateral Pinch NT NT   NT= Not tested due to acuity             Visit # 1  2  3 4   # of visits authorized: HMO 5 5 5 5   # of visits in OT POC:  10 10 10 10   Evaluation Initial X      Progress Report written         Manual Therapy          MEM  x       IASTM   x                 Ther ex           tendon glides x  x  x x   Finger add/abd x  x  x x   Fluidotherapy Fluidotherapy for 10 minutes to left hand, with AROM x digits performed.   Fluidotherapy for 10 minutes to left hand, with AROM x digits performed. Fluidotherapy for 10 minutes to left hand, with AROM x digits performed. Fluidotherapy for 10 minutes to left hand, with AROM x digits performed.    reverse blocking       10x    intrinsic activation       Finger add/abd with cotton ball mix                                   HEP/  Patient education topics See HEP notes below; issued Large Digi sleeve  issued pink sponge for in-hand manipulation; pink Jeannie finger wedges issued  wear splint when outside especially until PA discharges it.  Issued abby straps for RF to . Issued Large digisleeve   Therapeutic Activity         In-hand manipulation activities  Sponge, cotton ball mix, card deck Large beads, small balls, deck of cards, dog toy, cotton ball mix, pen/marker rolls, small sponge pieces Small rods, medium beads, cotton ball mix, vibrating ball, dog toy rotation   Graded grasp  Pen grasps, cones, yarn twisting Rice marble sifting Barley and bean transfers                                      Self care training        Objective measurements taken and reviewed with patient  See above    see above    Neuromuscular Re-education                                        Orthotic fitting and training       Taping   Rock tape applied in modified finger web to dorsum of  LRF    Modalities       X= performed this date as previously outlined    HEP  On initial eval, patient education was provided on exam findings, treatment diagnosis, treatment plan, expectations, and prognosis. Patient was also provided recommendations for use of heat.    HEP Date Issued Date modified Date discharged Comments    Tendon glides 3/11/24      Finger add/abd 3/11/24      Towel scrunches 3/11/24      Finger opp 3/11/24                   ASSESSMENT  Patient presents with improved fluidity of movement, but still with mild edema in the LRF which contributes to limited digital motion.    Goals: (to be met in 10 visits)  Patient will be independent and compliant with comprehensive HEP to maintain progress achieved in OT. (Progressing)    Patient will present with increase in left digit 2-5 WHITT to 200 to allow for ease with gripping toothbrush. (Progressing)  Patient will report no more than 1/10 pain in left hand during self care activities. (Progressing)  Patient will present with  strength in the left hand to that of 40% of the contralateral hand in order to be able to perform housework. (Progressing)  Patient will present with improved FMC in the left hand sufficient for crafting.(Progressing)    PLAN:  Frequency / Duration: Patient will be seen for 2 x/week or a total of 10 visits over a 90 day period.  Treatment will include: Manual Therapy, Neuromuscular Re-education, Self-Care Home Management, Therapeutic Activities, Therapeutic Exercise, Home Exercise Program instruction, Modalities to include: Ultrasound and FT, and paraffin  taping, and custom splinting.    Next session: FT, medium beading, small balls; cotton ball flicks    Charges:  1 TE, 2 TA   Total Timed Treatment: 43 minutes    Total Treatment Time: 43 minutes  WELLINGTON Rene, OTR/L, CHT

## 2024-03-21 ENCOUNTER — OFFICE VISIT (OUTPATIENT)
Facility: LOCATION | Age: 59
End: 2024-03-21
Payer: COMMERCIAL

## 2024-03-21 VITALS — HEART RATE: 76 BPM | TEMPERATURE: 98 F

## 2024-03-21 DIAGNOSIS — Z12.11 ENCOUNTER FOR SCREENING COLONOSCOPY: Primary | ICD-10-CM

## 2024-03-21 PROCEDURE — S0285 CNSLT BEFORE SCREEN COLONOSC: HCPCS | Performed by: SURGERY

## 2024-03-21 RX ORDER — POLYETHYLENE GLYCOL 3350, SODIUM CHLORIDE, SODIUM BICARBONATE, POTASSIUM CHLORIDE 420; 11.2; 5.72; 1.48 G/4L; G/4L; G/4L; G/4L
POWDER, FOR SOLUTION ORAL
Qty: 1 EACH | Refills: 0 | Status: SHIPPED | OUTPATIENT
Start: 2024-03-21

## 2024-03-21 NOTE — H&P
Augustina Baugh is a 58 year old female  Chief Complaint   Patient presents with    Colonoscopy     NP- Cscope Consult- pt denies family hx of colon cancer, denies symptoms, denies previous cscope        REFERRED BY    Patient presents for colonoscopy.  Patient states that she has never had a prior colonoscopy the history is per the patient's daughter.  She denies change in bowel habits denies chronic abdominal pain denies unexplained weight loss denies rectal bleeding she does claim constipation.  She takes Metamucil daily.       .         No past medical history on file.  No past surgical history on file.  Current Outpatient Medications on File Prior to Visit   Medication Sig Dispense Refill    acetaminophen-codeine (TYLENOL WITH CODEINE #3) 300-30 MG Oral Tab Take 1 tablet by mouth every 4 (four) hours as needed for Pain. 30 tablet 0    gabapentin 100 MG Oral Cap       nabumetone 750 MG Oral Tab Take 1 tablet (750 mg total) by mouth 2 (two) times daily with meals.      valACYclovir 500 MG Oral Tab       ascorbic acid 500 MG Oral Tab Take 1 tablet (500 mg total) by mouth 3 (three) times daily.      HYDROcodone-acetaminophen 5-325 MG Oral Tab Take 1 tablet by mouth. (Patient not taking: Reported on 2/28/2024)      diclofenac 50 MG Oral Tab EC TK 1 T PO BID. SWALLOW WHOLE.      cyclobenzaprine 10 MG Oral Tab Take 1 tablet (10 mg total) by mouth nightly.      ibuprofen 800 MG Oral Tab Take 1 tablet (800 mg total) by mouth.      loperamide 2 MG Oral Cap Take 1 capsule (2 mg total) by mouth every 2 (two) hours as needed.      vitamin E 1000 UNITS Oral Cap Take 1 capsule (1,000 Units total) by mouth.       No current facility-administered medications on file prior to visit.     @ALL@  No family history on file.  Social History     Socioeconomic History    Marital status:    Tobacco Use    Smoking status: Never    Smokeless tobacco: Never   Vaping Use    Vaping Use: Never used   Substance and Sexual Activity     Alcohol use: Not Currently    Drug use: Never    Sexual activity: Not Currently       ROS     GENERAL HEALTH: otherwise feels well, no weight loss, no fever or chills  SKIN: denies any unusual skin rashes or jaundice  HEENT: denies nasal congestion, sinus pain or sore throat; hearing loss negative,   EYES , no diplopia or vision changes  RESPIRATORY: denies shortness of breath, wheezing or cough   CARDIOVASCULAR: denies chest pain or KUMAR; no palpitations   GI: denies nausea, vomiting, constipation, diarrhea; no rectal bleeding; no heartburn  GENITAL/: no dysuria, urgency or frequency, no tea colored urine  MUSCULOSKELETAL: no joint complaints upper or lower extremities  HEMATOLOGY: denies hx anemia; denies bruising or excessive bleeding  Endocrine: no weight gain or loss no hot or cold intolerance    EXAM     Pulse 76, temperature 98 °F (36.7 °C), temperature source Temporal.  GENERAL: well developed, well nourished female, in no apparent distress.    MENTAL STATUS :Alert, oriented x 3  PSYCH: normal mood and affect  SKIN: anicteric, no rashes, no bruising  EYES: PERRLA, EOMI, sclera anicteric,  conjunctiva without pallor  HEENT: normocephalic, atraumatic, TMs clear, nares patent, mouth moist, pharynx w/o erythema  NECK: supple, no JVD, no adenopathy, no thyromegaly  RESPIRATORY: clear to auscultation, no rales , rhonchi or wheezing  CARDIOVASCULAR: RRR, murmur negative  ABDOMEN: normal active BS, soft, nondistended  no HSM, no masses or hernias  LYMPHATIC: no axillary , supraclavicular or inguinal lymphadenopathy  EXTREMITIES: no cyanosis, clubbing or edema, no atrophy, full ROM    STUDIES:     [facility] Lab Encounter on 02/22/2024   Component Date Value Ref Range Status    Glucose 02/22/2024 94  70 - 99 mg/dL Final    Sodium 02/22/2024 139  136 - 145 mmol/L Final    Potassium 02/22/2024 3.7  3.5 - 5.1 mmol/L Final    Chloride 02/22/2024 109  98 - 112 mmol/L Final    CO2 02/22/2024 23.0  21.0 - 32.0 mmol/L Final     Anion Gap 02/22/2024 7  0 - 18 mmol/L Final    BUN 02/22/2024 19  9 - 23 mg/dL Final    Creatinine 02/22/2024 0.77  0.55 - 1.02 mg/dL Final    Calcium, Total 02/22/2024 9.2  8.5 - 10.1 mg/dL Final    Calculated Osmolality 02/22/2024 290  275 - 295 mOsm/kg Final    eGFR-Cr 02/22/2024 88  >=60 mL/min/1.73m2 Final    AST 02/22/2024 13 (L)  15 - 37 U/L Final    ALT 02/22/2024 18  13 - 56 U/L Final    Alkaline Phosphatase 02/22/2024 100  46 - 118 U/L Final    Bilirubin, Total 02/22/2024 0.5  0.1 - 2.0 mg/dL Final    Total Protein 02/22/2024 6.7  6.4 - 8.2 g/dL Final    Albumin 02/22/2024 3.8  3.4 - 5.0 g/dL Final    Globulin  02/22/2024 2.9  2.8 - 4.4 g/dL Final    A/G Ratio 02/22/2024 1.3  1.0 - 2.0 Final    Patient Fasting for CMP? 02/22/2024 Yes   Final    Cholesterol, Total 02/22/2024 190  <200 mg/dL Final    Desirable  <200 mg/dL  Borderline  200-239 mg/dL  High      >=240 mg/dL        HDL Cholesterol 02/22/2024 63 (H)  40 - 59 mg/dL Final    Interpretive Information:   An HDL cholesterol <40 mg/dL is low and constitutes a coronary heart disease risk factor. An HDL cholesterol >60 mg/dL is a negative risk factor for coronary heart disease.        Triglycerides 02/22/2024 153 (H)  30 - 149 mg/dL Final    Reference interval for fasting triglycerides  Desirable: <150 mg/dL  Borderline: 150-199 mg/dL  High: 200-499 mg/dL  Very High: >=500 mg/dL          LDL Cholesterol 02/22/2024 101 (H)  <100 mg/dL Final    Desirable <100 mg/dL   Borderline 100-129 mg/dL   High     >=130mg/dL        VLDL 02/22/2024 25  0 - 30 mg/dL Final    Non HDL Chol 02/22/2024 127  <130 mg/dL Final    Desirable  <130 mg/dL   Borderline  130-159 mg/dL   High        160-189 mg/dL       Very high >=190 mg/dL        Patient Fasting for Lipid? 02/22/2024 Yes   Final    TSH 02/22/2024 5.670 (H)  0.358 - 3.740 mIU/mL Final    This test may exhibit interference when a sample is collected from a person who is consuming high dose of biotin (a.k.a., vitamin  B7, vitamin H, coenzyme R) supplements resulting in serum concentrations >100 ng/mL.  Intake of the recommended daily allowance (RDA) for biotin (0.03 mg) has not been shown to typically cause significant interference; however, high dose daily dietary supplements may contain biotin concentrations greater than 150 times (5-10 mg) the RDA.  It is recommended that physicians ask all patients who may be on biotin supplementation to stop biotin consumption at least 72 hours prior to collection of a new sample.      Vitamin B12 02/22/2024 294  193 - 986 pg/mL Final    Vitamin B12 Reference Range      Deficient:      <150 pg/mL      Indeterminate   150-192 pg/mL      Normal:         193-986 pg/mL      This test may exhibit interference when a sample is collected from a person who is consuming high dose of biotin (a.k.a., vitamin B7, vitamin H, coenzyme R) supplements resulting in serum concentrations >100 ng/mL.  Intake of the recommended daily allowance (RDA) for biotin (0.03 mg) has not been shown to typically cause significant interference; however, high dose daily dietary supplements may contain biotin concentrations greater than 150 times (5-10 mg) the RDA.  It is recommended that physicians ask all patients who may be on biotin supplementation to stop biotin consumption at least 72 hours prior to collection of a new sample.      WBC 02/22/2024 3.3 (L)  4.0 - 11.0 x10(3) uL Final    RBC 02/22/2024 5.11  3.80 - 5.30 x10(6)uL Final    HGB 02/22/2024 12.5  12.0 - 16.0 g/dL Final    HCT 02/22/2024 40.4  35.0 - 48.0 % Final    PLT 02/22/2024 155.0  150.0 - 450.0 10(3)uL Final    MCV 02/22/2024 79.1 (L)  80.0 - 100.0 fL Final    MCH 02/22/2024 24.5 (L)  26.0 - 34.0 pg Final    MCHC 02/22/2024 30.9 (L)  31.0 - 37.0 g/dL Final    RDW 02/22/2024 13.6  % Final    Neutrophil Absolute Prelim 02/22/2024 1.82  1.50 - 7.70 x10 (3) uL Final    Neutrophil Absolute 02/22/2024 1.82  1.50 - 7.70 x10(3) uL Final    Lymphocyte Absolute  02/22/2024 0.91 (L)  1.00 - 4.00 x10(3) uL Final    Monocyte Absolute 02/22/2024 0.39  0.10 - 1.00 x10(3) uL Final    Eosinophil Absolute 02/22/2024 0.10  0.00 - 0.70 x10(3) uL Final    Basophil Absolute 02/22/2024 0.02  0.00 - 0.20 x10(3) uL Final    Immature Granulocyte Absolute 02/22/2024 0.01  0.00 - 1.00 x10(3) uL Final    Neutrophil % 02/22/2024 56.0  % Final    Lymphocyte % 02/22/2024 28.0  % Final    Monocyte % 02/22/2024 12.0  % Final    Eosinophil % 02/22/2024 3.1  % Final    Basophil % 02/22/2024 0.6  % Final    Immature Granulocyte % 02/22/2024 0.3  % Final    Vitamin D, 25OH, Total 02/22/2024 27.5 (L)  30.0 - 100.0 ng/mL Final    Literature Recommendations for 25(OH)D levels are:  Range           Vitamin D Status   <20    ng/mL      Deficiency   20-<30 ng/mL      Insufficiency    ng/mL      Sufficiency   >100   ng/mL      Toxicity    *Clinical controversy exists regarding optimal 25(OH)D levels. Emerging evidence links potential adverse effects to high levels, particularly >60 ng/mL.        Free T4 02/22/2024 1.3  0.8 - 1.7 ng/dL Final    If applicable: Pregnancy Reference Intervals  First trimester 10-13 weeks gestation    0.9-1.4 ng/dL  Second trimester 14-26 weeks gestation   0.7-1.3 ng/dL      This test may exhibit interference when a sample is collected from a person who is consuming high dose of biotin (a.k.a., vitamin B7, vitamin H, coenzyme R) supplements resulting in serum concentrations >100 ng/mL.  Intake of the recommended daily allowance (RDA) for biotin (0.03 mg) has not been shown to typically cause significant interference; however, high dose daily dietary supplements may contain biotin concentrations greater than 150 times (5-10 mg) the RDA.  It is recommended that physicians ask all patients who may be on biotin supplementation to stop biotin consumption at least 72 hours prior to collection of a new sample.         ASSESSMENT   Imp: Average risk screening colonoscopy,  constipation recommend patient take MiraLAX daily.  Discussed with patient and daughter risks of procedure to include bleeding and bowel perforation with surgery to repair  PLan:      Meds & Refills for this Visit:  Requested Prescriptions     Signed Prescriptions Disp Refills    PEG 3350-KCl-Na Bicarb-NaCl (TRILYTE) 420 g Oral Recon Soln 1 each 0     Sig: Starting at 4:00 pm the night before procedure, drink 8 ounces of the prep every 15-20 minutes until finished       Imaging & Consults:  None

## 2024-03-22 ENCOUNTER — OFFICE VISIT (OUTPATIENT)
Dept: OCCUPATIONAL MEDICINE | Age: 59
End: 2024-03-22
Attending: PHYSICIAN ASSISTANT
Payer: COMMERCIAL

## 2024-03-22 PROCEDURE — 97530 THERAPEUTIC ACTIVITIES: CPT

## 2024-03-22 PROCEDURE — 97110 THERAPEUTIC EXERCISES: CPT

## 2024-03-22 NOTE — PROGRESS NOTES
ProgressSummary  Pt has attended 5 visits in Occupational Therapy.     Diagnosis:        Closed displaced fracture of phalanx of left ring finger, unspecified phalanx, initial encounter (S37.163O) Referring Provider: David  Date of Evaluation:    3/11/24    Precautions:  None per orders Next MD/PA visit: TBS  DOI: 2/4/24  Date of Surgery: n/a   Insurance Primary/Secondary: [employer] [place] HMO / N/A     # Auth Visits: n/a            Orders:     Order Date:  3/5/2024  Authorizing Provider:   LAURA KOWALSKI     Procedure:  OP REFERRAL TO [facility] [[id number]]         Order #:   [id number]  Qty:  1     Priority:  Routine                   Class:   IHP - RFL     Standing Interval:          Standing Occurrences:          Expires on:            Expected by:    Associated DX:  Closed displaced fracture of phalanx of left ring finger, unspecified phalanx, initial encounter (S62.607K)     Order summary:  IHP - RFL, Routine, 8 visits  Occupational  Therapy Area of Concentration: Orthopedic  Occupational Therapy Ortho: UE  If the patient can be seen sooner with a PT vs an OT, can we cancel this order and replace with a PT order? No         Comments:  Gentle rom , no strengthening  2x/week 6 weeks    SUBJECTIVE:  \"She said no more splint.\"    Pain 3/10 with active motion, 0 at rest; 5/10 with passive stretch      OBJECTIVE:        OUTCOME MEASURE   On Initial Evaluation:  QuickDASH Outcome Score  Score: 70.45 % (3/11/2024  6:15 PM)      Interim  Post QuickDASH Outcome Score  Post Score: 63.64 % (3/28/2024  9:43 AM)  6.81 % improvement        SENSORY: WNL    Wrist AROM: extension 60, flexion 45    Date:  3/11/24 3/28/24   Edema Girth measurements (cm)  Right/Left  left left   Digit: RF P1  PIP  P2  DIP  P3 7.5  7.1  5.5  4.8  4.7 7.2  6.6  5.3  4.8  4.5     DATE: 3/11/24 Left  Digital AROM (°) Index Finger Middle Finger Ring Finger Small Finger   MP Ext/Flex 0/55 0/70 0/40 0/60   PIP Ext/Flex 0/95 0/90 15/65 0/60    DIP Ext/Flex 0/30 0/50 0/30 0/45   WHITT 180 210 120 165     Date: 3/20/24  LEFT  Digital AROM (°)  Index Finger  Middle Finger  Ring Finger  Small Finger    MP Ext/Flex  0/70 0/75 0/55 0/70   PIP Ext/Flex  0/100 0/95 15/80 0/75   DIP Ext/Flex  0/65 0/60 +5/45 0/65   WHITT  235 230 160 210       Date: 3/28/24  LEFT  Digital AROM (°)  Index Finger  Middle Finger  Ring Finger  Small Finger    MP Ext/Flex  0/60 0/75 0/55 0/65   PIP Ext/Flex  0/100 0/95 15/80 0/90   DIP Ext/Flex  0/50 0/45 +5/40 0/65   WHITT  210 215 155 220          Strength (lbs)  Right/Left Right  3/11/24 Left   3/11/24    NT NT   3 Point Pinch  NT NT   Lateral Pinch NT NT   NT= Not tested due to acuity             Date 3/11/24 3/15/24 3/20/24 3/22/24 3/28/24   Visit # 1  2  3 4 5   # of visits authorized: HMO 5 5 5 5 5   # of visits in OT POC:  10 10 10 10 10   Evaluation Initial X       Progress Report written       x   Manual Therapy           MEM  x        IASTM   x                   Ther ex            tendon glides x  x  x x x   Finger add/abd x  x  x x x   Fluidotherapy Fluidotherapy for 10 minutes to left hand, with AROM x digits performed.   Fluidotherapy for 10 minutes to left hand, with AROM x digits performed. Fluidotherapy for 10 minutes to left hand, with AROM x digits performed. Fluidotherapy for 10 minutes to left hand, with AROM x digits performed. Fluidotherapy for 10 minutes to left hand, with AROM x digits performed.    reverse blocking       10x     intrinsic activation       Finger add/abd with cotton ball mix    Putty roll        yellow   Putty         Yellow, 2 minutes              HEP/  Patient education topics See HEP notes below; issued Large Digi sleeve  issued pink sponge for in-hand manipulation; pink Jeannie finger wedges issued  wear splint when outside especially until PA discharges it.  Issued abby straps for RF to MF. Issued Large digisleeve Issued yellow glove   Therapeutic Activity          In-hand  manipulation activities  Sponge, cotton ball mix, card deck Large beads, small balls, deck of cards, dog toy, cotton ball mix, pen/marker rolls, small sponge pieces Small rods, medium beads, cotton ball mix, vibrating ball, dog toy rotation Stones, coins, small deck of cards   Graded grasp  Pen grasps, cones, yarn twisting Rice marble sifting Barley and bean transfers [place] rice sifting   Bulk putty: , pull, pinch and search                                        Self care training         Objective measurements taken and reviewed with patient  See above    see above  See above   Neuromuscular Re-education                                             Orthotic fitting and training        Taping   Rock tape applied in modified finger web to dorsum of LRF     Modalities        X= performed this date as previously outlined    HEP  On initial eval, patient education was provided on exam findings, treatment diagnosis, treatment plan, expectations, and prognosis. Patient was also provided recommendations for use of heat.    HEP Date Issued Date modified Date discharged Comments    Tendon glides 3/11/24      Finger add/abd 3/11/24      Towel scrunches 3/11/24      Finger opp 3/11/24      Putty roll and grasp 3/28/24            ASSESSMENT  Patient has been seen in Occupational Therapy for 5/5 scheduled sessions. Focus of skilled OT has been on ROM, pain and edema.  She has demonstrated improvement in the areas of ROM overall, pain and edema.  Patient rated outcome measure of the Quick DASH indicates improvement in function as well.  However, patient presents with the following ongoing deficits: decreased ROM that are interfering with functional task performance.  She could benefit from further skilled OT to address these performance components and facilitate recovery of function for return to work, self care and leisure activities.      Goals: (to be met in 10 visits)  Patient will be independent and compliant with  comprehensive HEP to maintain progress achieved in OT. (Progressing)    Patient will present with increase in left digit 2-5 WHITT to 200 to allow for ease with gripping toothbrush. (Progressing)  Patient will report no more than 1/10 pain in left hand during self care activities. (Progressing)  Patient will present with  strength in the left hand to that of 40% of the contralateral hand in order to be able to perform housework. (Progressing)  Patient will present with improved FMC in the left hand sufficient for crafting.(Progressing)    PLAN:  Frequency / Duration: Patient will be seen for 2 x/week or a total of 10 visits over a 90 day period.  Treatment will include: Manual Therapy, Neuromuscular Re-education, Self-Care Home Management, Therapeutic Activities, Therapeutic Exercise, Home Exercise Program instruction, Modalities to include: Ultrasound and FT, and paraffin  taping, and custom splinting.    Next session: FT, bulk putty; cotton ball flicks; unilateral tennis ball on Frisbee    Charges:  1 TE, 2 TA   Total Timed Treatment: 42 minutes    Total Treatment Time: 42 minutes  WELLINGTON Rene, OTR/L, CHT

## 2024-03-26 ENCOUNTER — TELEPHONE (OUTPATIENT)
Facility: LOCATION | Age: 59
End: 2024-03-26

## 2024-03-27 ENCOUNTER — HOSPITAL ENCOUNTER (OUTPATIENT)
Dept: GENERAL RADIOLOGY | Age: 59
Discharge: HOME | End: 2024-03-27
Attending: PHYSICIAN ASSISTANT
Payer: COMMERCIAL

## 2024-03-27 ENCOUNTER — OFFICE VISIT (OUTPATIENT)
Dept: ORTHOPEDICS CLINIC | Facility: CLINIC | Age: 59
End: 2024-03-27
Payer: COMMERCIAL

## 2024-03-27 VITALS — HEIGHT: 62 IN | BODY MASS INDEX: 29.63 KG/M2 | WEIGHT: 161 LBS

## 2024-03-27 DIAGNOSIS — S62.605A CLOSED DISPLACED FRACTURE OF PHALANX OF LEFT RING FINGER, UNSPECIFIED PHALANX, INITIAL ENCOUNTER: Primary | ICD-10-CM

## 2024-03-27 DIAGNOSIS — S62.605A CLOSED DISPLACED FRACTURE OF PHALANX OF LEFT RING FINGER, UNSPECIFIED PHALANX, INITIAL ENCOUNTER: ICD-10-CM

## 2024-03-27 PROCEDURE — 99213 OFFICE O/P EST LOW 20 MIN: CPT | Performed by: PHYSICIAN ASSISTANT

## 2024-03-27 PROCEDURE — 73130 X-RAY EXAM OF HAND: CPT | Performed by: PHYSICIAN ASSISTANT

## 2024-03-27 PROCEDURE — 3008F BODY MASS INDEX DOCD: CPT | Performed by: PHYSICIAN ASSISTANT

## 2024-03-27 NOTE — PROGRESS NOTES
Clinic Note [facility]     Assessment/Plan:  58 year old female    Left hand left ring finger proximal phalanx fracture, minimally displaced-I would like her to continue in therapy working aggressive range of motion and weaning out of the brace.  She can begin light strengthening.  I will see her back in 4 weeks to monitor her motion    No diagnosis found.       Follow Up: 4 weeks with no need for x-ray    Diagnostic Studies:  X-rays are negative for acute fractures occasions of    Physical Exam:    Ht 5' 2\" (1.575 m)   Wt 161 lb (73 kg)   LMP  (LMP Unknown)   BMI 29.45 kg/m²     Constitutional: NAD. AOx3. Well-developed and Well-nourished.   Psychiatric: Normal mood/ affect/ behavior. Judgment and thought content normal.     Left upper Extremity:   Inspection: Skin Intact. No skin lesions. No gross deformity.   Palpation: Tender palpation of the left ring finger at the proximal phalanx but significantly better since last visit   Motion: Elbow: normal bilateral symmetric ext/flex  Wrist: normal bilateral symmetric ext/flex/sup/pro  Finger: full composite fist, PIP extension/flexion 20/70       CC: Follow - Up (LT RING FINGER FX)        HPI: This 58 year old female presents with complaints of left ring finger pain.  She states on 2/2/2024 her finger stuck in a dog collar and her finger went the opposite direction she pushed it back in place.  Her pain is improved since then.  She is been an ulnar gutter brace.  She is here for further evaluation    Interval history: Patient states that she is in therapy working on range of motion exercises.  Should her pain is a lot better    No past medical history on file.  No past surgical history on file.  Current Outpatient Medications   Medication Sig Dispense Refill    PEG 3350-KCl-Na Bicarb-NaCl (TRILYTE) 420 g Oral Recon Soln Starting at 4:00 pm the night before procedure, drink 8 ounces of the prep every 15-20 minutes until finished 1 each 0     acetaminophen-codeine (TYLENOL WITH CODEINE #3) 300-30 MG Oral Tab Take 1 tablet by mouth every 4 (four) hours as needed for Pain. 30 tablet 0    gabapentin 100 MG Oral Cap       nabumetone 750 MG Oral Tab Take 1 tablet (750 mg total) by mouth 2 (two) times daily with meals.      valACYclovir 500 MG Oral Tab       ascorbic acid 500 MG Oral Tab Take 1 tablet (500 mg total) by mouth 3 (three) times daily.      diclofenac 50 MG Oral Tab EC TK 1 T PO BID. SWALLOW WHOLE.      cyclobenzaprine 10 MG Oral Tab Take 1 tablet (10 mg total) by mouth nightly.      ibuprofen 800 MG Oral Tab Take 1 tablet (800 mg total) by mouth.      loperamide 2 MG Oral Cap Take 1 capsule (2 mg total) by mouth every 2 (two) hours as needed.      vitamin E 1000 UNITS Oral Cap Take 1 capsule (1,000 Units total) by mouth.      HYDROcodone-acetaminophen 5-325 MG Oral Tab Take 1 tablet by mouth. (Patient not taking: Reported on 2/28/2024)       No Known Allergies  No family history on file.  Social History     Occupational History    Not on file   Tobacco Use    Smoking status: Never    Smokeless tobacco: Never   Vaping Use    Vaping Use: Never used   Substance and Sexual Activity    Alcohol use: Not Currently    Drug use: Never    Sexual activity: Not Currently        Review of Systems (negative unless bolded):  General: fevers, chills, fatigue  CV:  chest pain, palpitations, leg swelling  Msk: bodyaches, neck pain, neck stiffness  Skin: rashes, open wounds, nonhealing ulcers  Hem: bleeds easily, bruise easily, immunocompromised  Neuro: dizziness, light headedness, headaches  Psych: anxious, depressed, anger issues      Holly Nj PA-C  Hand, Wrist, & Elbow Surgery  Physician Assistant to Dr. Wade Enriquez  [facility]  [address] [place] [address], [address], [email or web address]  [email or web address]  t: [phone]  f: [phone]

## 2024-03-28 ENCOUNTER — OFFICE VISIT (OUTPATIENT)
Dept: OCCUPATIONAL MEDICINE | Age: 59
End: 2024-03-28
Attending: PHYSICIAN ASSISTANT
Payer: COMMERCIAL

## 2024-03-28 PROCEDURE — 97110 THERAPEUTIC EXERCISES: CPT

## 2024-03-28 PROCEDURE — 97530 THERAPEUTIC ACTIVITIES: CPT

## 2024-04-01 ENCOUNTER — TELEPHONE (OUTPATIENT)
Dept: FAMILY MEDICINE CLINIC | Facility: CLINIC | Age: 59
End: 2024-04-01

## 2024-04-01 DIAGNOSIS — C53.0 CANCER OF ENDOCERVIX (HCC): ICD-10-CM

## 2024-04-01 DIAGNOSIS — C53.9 MALIGNANT NEOPLASM OF CERVIX, UNSPECIFIED SITE (HCC): Primary | ICD-10-CM

## 2024-04-01 NOTE — TELEPHONE ENCOUNTER
Pt insurance changed to [facility] HMO (already updated).  Pt needs referral to Dr. Melony Wright, oncologist, at [facility]

## 2024-04-01 NOTE — PROGRESS NOTES
Diagnosis:        Closed displaced fracture of phalanx of left ring finger, unspecified phalanx, initial encounter (S62.605A) Referring Provider: David  Date of Evaluation:    3/11/24    Precautions:  None per orders Next MD/PA visit: TBS  DOI: 2/4/24  Date of Surgery: n/a   Insurance Primary/Secondary: [employer] HMO / N/A     # Auth Visits: 10           Orders:     Order Date:  3/5/2024  Authorizing Provider:   LAURA KOWALSKI     Procedure:  OP REFERRAL TO [facility] [[id number]]         Order #:   [id number]  Qty:  1     Priority:  Routine                   Class:   IHP - RFL     Standing Interval:          Standing Occurrences:          Expires on:            Expected by:    Associated DX:  Closed displaced fracture of phalanx of left ring finger, unspecified phalanx, initial encounter (S62.604A)     Order summary:  IHP - RFL, Routine, 8 visits  Occupational  Therapy Area of Concentration: Orthopedic  Occupational Therapy Ortho: UE  If the patient can be seen sooner with a PT vs an OT, can we cancel this order and replace with a PT order? No         Comments:  Gentle rom , no strengthening  2x/week 6 weeks    SUBJECTIVE:  \"It' good.\"    Pain 0/10      OBJECTIVE:        OUTCOME MEASURE   On Initial Evaluation:  QuickDASH Outcome Score  Score: 70.45 % (3/11/2024  6:15 PM)      Interim  Post QuickDASH Outcome Score  Post Score: 63.64 % (3/28/2024  9:43 AM)  6.81 % improvement        SENSORY: WNL    Wrist AROM: extension 60, flexion 45    Date:  3/11/24 3/28/24   Edema Girth measurements (cm)  Right/Left  left left   Digit: RF P1  PIP  P2  DIP  P3 7.5  7.1  5.5  4.8  4.7 7.2  6.6  5.3  4.8  4.5     DATE: 3/11/24 Left  Digital AROM (°) Index Finger Middle Finger Ring Finger Small Finger   MP Ext/Flex 0/55 0/70 0/40 0/60   PIP Ext/Flex 0/95 0/90 15/65 0/60   DIP Ext/Flex 0/30 0/50 0/30 0/45   WHITT 180 210 120 165     Date: 3/20/24  LEFT  Digital AROM (°)  Index Finger  Middle Finger  Ring Finger   Small Finger    MP Ext/Flex  0/70 0/75 0/55 0/70   PIP Ext/Flex  0/100 0/95 15/80 0/75   DIP Ext/Flex  0/65 0/60 +5/45 0/65   WHITT  235 230 160 210       Date: 3/28/24  LEFT  Digital AROM (°)  Index Finger  Middle Finger  Ring Finger  Small Finger    MP Ext/Flex  0/60 0/75 0/55 0/65   PIP Ext/Flex  0/100 0/95 15/80 0/90   DIP Ext/Flex  0/50 0/45 +5/40 0/65   WHITT  210 215 155 220          Strength (lbs)  Right/Left Right  3/11/24 Left   3/11/24    NT NT   3 Point Pinch  NT NT   Lateral Pinch NT NT   NT= Not tested due to acuity             Date 3/11/24 3/15/24 3/20/24 3/22/24 3/28/24 4/4/24   Visit # 1  2  3 4 5 6   # of visits authorized: HMO 5 5 5 5 5 10   # of visits in OT POC:  10 10 10 10 10 10   Evaluation Initial X        Progress Report written       x    Manual Therapy            MEM  x         IASTM   x                     Ther ex             tendon glides x  x  x x x x   Finger add/abd x  x  x x x x   Fluidotherapy Fluidotherapy for 10 minutes to left hand, with AROM x digits performed.   Fluidotherapy for 10 minutes to left hand, with AROM x digits performed. Fluidotherapy for 10 minutes to left hand, with AROM x digits performed. Fluidotherapy for 10 minutes to left hand, with AROM x digits performed. Fluidotherapy for 10 minutes to left hand, with AROM x digits performed. Fluidotherapy for 10 minutes to left hand, with AROM x digits performed.    reverse blocking       10x      intrinsic activation       Finger add/abd with cotton ball mix  Finger add/abd with cotton ball mix; lumbrical gripper 1 with cotton ball mix   Putty roll        yellow Yellow    Putty         Yellow, 2 minutes yellow               HEP/  Patient education topics See HEP notes below; issued Large Digi sleeve  issued pink sponge for in-hand manipulation; pink Jeannie finger wedges issued  wear splint when outside especially until PA discharges it.  Issued abby straps for RF to MF. Issued Large digisleeve Issued yellow  -correction: putty Edema control with digisleeve   Therapeutic Activity           In-hand manipulation activities  Sponge, cotton ball mix, card deck Large beads, small balls, deck of cards, dog toy, cotton ball mix, pen/marker rolls, small sponge pieces Small rods, medium beads, cotton ball mix, vibrating ball, dog toy rotation Stones, coins, small deck of cards Stones stacking   Graded grasp  Pen grasps, cones, yarn twisting Rice marble sifting Barley and bean transfers [place] rice sifting    Bulk putty: , pull, pinch and search      yellow   Finger extension activities      Golf ball on game tray                              Self care training          Objective measurements taken and reviewed with patient  See above    see above  See above    Neuromuscular Re-education                                                  Orthotic fitting and training         Taping   Rock tape applied in modified finger web to dorsum of LRF      Modalities         X= performed this date as previously outlined    HEP  On initial eval, patient education was provided on exam findings, treatment diagnosis, treatment plan, expectations, and prognosis. Patient was also provided recommendations for use of heat.    HEP Date Issued Date modified Date discharged Comments    Tendon glides 3/11/24      Finger add/abd 3/11/24      Towel scrunches 3/11/24      Finger opp 3/11/24      Putty roll and grasp 3/28/24            ASSESSMENT  Patient presents with improved activity tolerance with the left hand.  Digital rotation still noted, but does not appear to be interfering with function.      Goals: (to be met in 10 visits)  Patient will be independent and compliant with comprehensive HEP to maintain progress achieved in OT. (Progressing)    Patient will present with increase in left digit 2-5 WHITT to 200 to allow for ease with gripping toothbrush. (Progressing)  Patient will report no more than 1/10 pain in left hand during self care  activities. (Progressing)  Patient will present with  strength in the left hand to that of 40% of the contralateral hand in order to be able to perform housework. (Progressing)  Patient will present with improved FMC in the left hand sufficient for crafting.(Progressing)    PLAN:  Frequency / Duration: Patient will be seen for 2 x/week or a total of 10 visits over a 90 day period.  Treatment will include: Manual Therapy, Neuromuscular Re-education, Self-Care Home Management, Therapeutic Activities, Therapeutic Exercise, Home Exercise Program instruction, Modalities to include: Ultrasound and FT, and paraffin  taping, and custom splinting.    Next session: FT, bulk putty; cotton ball flicks    Charges:  2TE, 1 TA   Total Timed Treatment: 40 minutes    Total Treatment Time: 40 minutes  WELLINGTON Rene, OTR/L, CHT

## 2024-04-04 ENCOUNTER — OFFICE VISIT (OUTPATIENT)
Dept: OCCUPATIONAL MEDICINE | Age: 59
End: 2024-04-04
Attending: PHYSICIAN ASSISTANT
Payer: COMMERCIAL

## 2024-04-04 PROCEDURE — 97530 THERAPEUTIC ACTIVITIES: CPT

## 2024-04-04 PROCEDURE — 97110 THERAPEUTIC EXERCISES: CPT

## 2024-04-05 NOTE — PROGRESS NOTES
Diagnosis:        Closed displaced fracture of phalanx of left ring finger, unspecified phalanx, initial encounter (S62.607T) Referring Provider: David  Date of Evaluation:    3/11/24    Precautions:  None per orders Next MD/PA visit: TBS  DOI: 2/4/24  Date of Surgery: n/a   Insurance Primary/Secondary: [place] / N/A     # Auth Visits: 10           Orders:     Order Date:  3/5/2024  Authorizing Provider:   LAURA KOWALSKI     Procedure:  [place] [facility] [[id number]]         Order #:   [id number]  Qty:  1     Priority:  Routine                   Class:   IHP - RFL     Standing Interval:          Standing Occurrences:          Expires on:            Expected by:    Associated DX:  Closed displaced fracture of phalanx of left ring finger, unspecified phalanx, initial encounter (S62.608S)     Order summary:  IHP - RFL, Routine, 8 visits  Occupational  Therapy Area of Concentration: Orthopedic  Occupational Therapy Ortho: UE  If the patient can be seen sooner with a PT vs an OT, can we cancel this order and replace with a PT order? No         Comments:  Gentle rom , no strengthening  2x/week 6 weeks    SUBJECTIVE:  C/o leg pain and expressed confusion about who to f/u with. [Referred back to PCP for health maintenance.]  Reports able to use hand but does not include RF at times.    Pain 0/10      OBJECTIVE:      OUTCOME MEASURE   On Initial Evaluation:  QuickDASH Outcome Score  Score: 70.45 % (3/11/2024  6:15 PM)      Interim  Post QuickDASH Outcome Score  Post Score: 63.64 % (3/28/2024  9:43 AM)  6.81 % improvement        SENSORY: WNL    Wrist AROM: extension 60, flexion 45    Date:  3/11/24 3/28/24   Edema Girth measurements (cm)  Right/Left  left left   Digit: RF P1  PIP  P2  DIP  P3 7.5  7.1  5.5  4.8  4.7 7.2  6.6  5.3  4.8  4.5     DATE: 3/11/24 Left  Digital AROM (°) Index Finger Middle Finger Ring Finger Small Finger   MP Ext/Flex 0/55 0/70 0/40 0/60   PIP Ext/Flex 0/95 0/90  15/65 0/60   DIP Ext/Flex 0/30 0/50 0/30 0/45   WHITT 180 210 120 165     Date: 3/20/24  LEFT  Digital AROM (°)  Index Finger  Middle Finger  Ring Finger  Small Finger    MP Ext/Flex  0/70 0/75 0/55 0/70   PIP Ext/Flex  0/100 0/95 15/80 0/75   DIP Ext/Flex  0/65 0/60 +5/45 0/65   WHITT  235 230 160 210       Date: 3/28/24  LEFT  Digital AROM (°)  Index Finger  Middle Finger  Ring Finger  Small Finger    MP Ext/Flex  0/60 0/75 0/55 0/65   PIP Ext/Flex  0/100 0/95 15/80 0/90   DIP Ext/Flex  0/50 0/45 +5/40 0/65   WHITT  210 215 155 220       Date: 4/9/24  LEFT  Digital AROM (°)  Index Finger  Middle Finger  Ring Finger  Small Finger    MP Ext/Flex  0/65 0/70 0/60 0/70   PIP Ext/Flex  0/105 0/100 10/75 0/85   DIP Ext/Flex  0/50 0/60 +5/45 0/65   WHITT  220 230 165 220          Strength (lbs)  Right/Left Right  3/11/24 Left   3/11/24 Right/left 4/9/24    NT NT 50/35   3 Point Pinch  NT NT N/a   Lateral Pinch NT NT N/a   NT= Not tested due to acuity             Date 3/15/24 3/20/24 3/22/24 3/28/24 4/4/24 4/9/24   Visit #  2  3 4 5 6 7   # of visits authorized: HMO 5 5 5 5 10 10   # of visits in OT POC:  10 10 10 10 10 10   Evaluation         Progress Report written      x     Manual Therapy           MEM x          IASTM x                     Ther ex            tendon glides  x  x x x x x   Finger add/abd  x  x x x x x   Fluidotherapy Fluidotherapy for 10 minutes to left hand, with AROM x digits performed. Fluidotherapy for 10 minutes to left hand, with AROM x digits performed. Fluidotherapy for 10 minutes to left hand, with AROM x digits performed. Fluidotherapy for 10 minutes to left hand, with AROM x digits performed. Fluidotherapy for 10 minutes to left hand, with AROM x digits performed. Fluidotherapy for 10 minutes to left hand, with AROM x digits performed.    reverse blocking     10x       intrinsic activation     Finger add/abd with cotton ball mix  Finger add/abd with cotton ball mix; lumbrical gripper 1 with  cotton ball mix Finger add/abd with cotton ball mix; lumbrical gripper 1 with cotton ball mix   Putty roll      yellow Yellow     Putty       Yellow, 2 minutes yellow                HEP/  Patient education topics  issued pink sponge for in-hand manipulation; pink Jeannie finger wedges issued  wear splint when outside especially until PA discharges it.  Issued abby straps for RF to MF. Issued Large digisleeve Issued yellow -correction: putty Edema control with digisleeve Use of digi sleeve (still not observed on nor with any signs of its use)   Therapeutic Activity           In-hand manipulation activities Sponge, cotton ball mix, card deck Large beads, small balls, deck of cards, dog toy, cotton ball mix, pen/marker rolls, small sponge pieces Small rods, medium beads, cotton ball mix, vibrating ball, dog toy rotation Stones, coins, small deck of cards Stones stacking Flexbar rotation, vibrating ball on high   Graded grasp Pen grasps, cones, yarn twisting Rice marble sifting Barley and bean transfers [place] rice sifting  True balance   Bulk putty: , pull, pinch and search     yellow yellow   Finger extension activities     Golf ball on game tray Tennis ball on Frisbee and golf ball on game tray                              Self care training          Objective measurements taken and reviewed with patient    see above  See above  See above   Neuromuscular Re-education                                               Orthotic fitting and training         Taping  Rock tape applied in modified finger web to dorsum of LRF       Modalities         X= performed this date as previously outlined    HEP  On initial eval, patient education was provided on exam findings, treatment diagnosis, treatment plan, expectations, and prognosis. Patient was also provided recommendations for use of heat.    HEP Date Issued Date modified Date discharged Comments    Tendon glides 3/11/24      Finger add/abd 3/11/24      Towel scrunches  3/11/24      Finger opp 3/11/24      Putty roll and grasp 3/28/24            ASSESSMENT  Patient presents with slight increase in WHITT in the left hand, but RF is relatively unchanged overall.  Has a functional ROM , but strength is reduced.  Tends to exclude RF at times.      Goals: (to be met in 10 visits)  Patient will be independent and compliant with comprehensive HEP to maintain progress achieved in OT. (Progressing)    Patient will present with increase in left digit 2-5 WHITT to 200 to allow for ease with gripping toothbrush. (Progressing)  Patient will report no more than 1/10 pain in left hand during self care activities. (Progressing)  Patient will present with  strength in the left hand to that of 40% of the contralateral hand in order to be able to perform housework. (Met)  Patient will present with improved FMC in the left hand sufficient for crafting.(Progressing)    PLAN:  Frequency / Duration: Patient will be seen for 2 x/week or a total of 10 visits over a 90 day period.  Treatment will include: Manual Therapy, Neuromuscular Re-education, Self-Care Home Management, Therapeutic Activities, Therapeutic Exercise, Home Exercise Program instruction, Modalities to include: Ultrasound and FT, and paraffin  taping, and custom splinting.    Next session: FT, bulk putty    Charges:  2TE, 1 TA   Total Timed Treatment: 39 minutes  Pt arrived 6 minutes late for session. Unable to accommodate for full 45 minutes.  Total Treatment Time: 39 minutes  WELLINGTON Rene, OTR/L, CHT

## 2024-04-09 ENCOUNTER — OFFICE VISIT (OUTPATIENT)
Dept: OCCUPATIONAL MEDICINE | Age: 59
End: 2024-04-09
Attending: PHYSICIAN ASSISTANT
Payer: COMMERCIAL

## 2024-04-09 PROCEDURE — 97110 THERAPEUTIC EXERCISES: CPT

## 2024-04-09 PROCEDURE — 97530 THERAPEUTIC ACTIVITIES: CPT

## 2024-04-09 NOTE — PROGRESS NOTES
Diagnosis:        Closed displaced fracture of phalanx of left ring finger, unspecified phalanx, initial encounter (S62.605A) Referring Provider: David  Date of Evaluation:    3/11/24    Precautions:  None per orders Next MD/PA visit: 4/11/24  DOI: 2/4/24  Date of Surgery: n/a   Insurance Primary/Secondary: [place] / N/A     # Auth Visits: 10           Orders:     Order Date:  3/5/2024  Authorizing Provider:   LAURA KOWALSKI     Procedure:  OP REFERRAL TO [place] [facility] [[id number]]         Order #:   [id number]  Qty:  1     Priority:  Routine                   Class:   IHP - RFL     Standing Interval:          Standing Occurrences:          Expires on:            Expected by:    Associated DX:  Closed displaced fracture of phalanx of left ring finger, unspecified phalanx, initial encounter (S62.605A)     Order summary:  IHP - RFL, Routine, 8 visits  Occupational  Therapy Area of Concentration: Orthopedic  Occupational Therapy Ortho: UE  If the patient can be seen sooner with a PT vs an OT, can we cancel this order and replace with a PT order? No         Comments:  Gentle rom , no strengthening  2x/week 6 weeks    SUBJECTIVE:  \"It's still swollen.\"  Reports intermittent use of digi sleeve.    Pain 0/10      OBJECTIVE:      OUTCOME MEASURE   On Initial Evaluation:  QuickDASH Outcome Score  Score: 70.45 % (3/11/2024  6:15 PM)      Interim  Post QuickDASH Outcome Score  Post Score: 63.64 % (3/28/2024  9:43 AM)  6.81 % improvement        SENSORY: WNL    Wrist AROM: extension 60, flexion 45    Date:  3/11/24 3/28/24   Edema Girth measurements (cm)  Right/Left  left left   Digit: RF P1  PIP  P2  DIP  P3 7.5  7.1  5.5  4.8  4.7 7.2  6.6  5.3  4.8  4.5     DATE: 3/11/24 Left  Digital AROM (°) Index Finger Middle Finger Ring Finger Small Finger   MP Ext/Flex 0/55 0/70 0/40 0/60   PIP Ext/Flex 0/95 0/90 15/65 0/60   DIP Ext/Flex 0/30 0/50 0/30 0/45   WHITT 180 210 120 165     Date: 3/20/24  LEFT  Digital  AROM (°)  Index Finger  Middle Finger  Ring Finger  Small Finger    MP Ext/Flex  0/70 0/75 0/55 0/70   PIP Ext/Flex  0/100 0/95 15/80 0/75   DIP Ext/Flex  0/65 0/60 +5/45 0/65   WHITT  235 230 160 210       Date: 3/28/24  LEFT  Digital AROM (°)  Index Finger  Middle Finger  Ring Finger  Small Finger    MP Ext/Flex  0/60 0/75 0/55 0/65   PIP Ext/Flex  0/100 0/95 15/80 0/90   DIP Ext/Flex  0/50 0/45 +5/40 0/65   WHITT  210 215 155 220       Date: 4/9/24  LEFT  Digital AROM (°)  Index Finger  Middle Finger  Ring Finger  Small Finger    MP Ext/Flex  0/65 0/70 0/60 0/70   PIP Ext/Flex  0/105 0/100 10/75 0/85   DIP Ext/Flex  0/50 0/60 +5/45 0/65   WHITT  220 230 165 220          Strength (lbs)  Right/Left Right  3/11/24 Left   3/11/24 Right/left 4/9/24    NT NT 50/35   3 Point Pinch  NT NT N/a   Lateral Pinch NT NT N/a   NT= Not tested due to acuity             Date 3/20/24 3/22/24 3/28/24 4/4/24 4/9/24 4/11/24   Visit #  3 4 5 6 7 8   # of visits authorized: HMO 5 5 5 10 10 10   # of visits in OT POC:  10 10 10 10 10 10   Evaluation         Progress Report written    x      Manual Therapy          MEM          IASTM                     Ther ex           tendon glides  x x x x x x   Finger add/abd  x x x x x x   Fluidotherapy Fluidotherapy for 10 minutes to left hand, with AROM x digits performed. Fluidotherapy for 10 minutes to left hand, with AROM x digits performed. Fluidotherapy for 10 minutes to left hand, with AROM x digits performed. Fluidotherapy for 10 minutes to left hand, with AROM x digits performed. Fluidotherapy for 10 minutes to left hand, with AROM x digits performed. Fluidotherapy for 10 minutes to left hand, with AROM x digits performed.    reverse blocking   10x        intrinsic activation   Finger add/abd with cotton ball mix  Finger add/abd with cotton ball mix; lumbrical gripper 1 with cotton ball mix Finger add/abd with cotton ball mix; lumbrical gripper 1 with cotton ball mix Finger add/abd with  cotton ball mix; lumbrical gripper 1 with yellow sponge insert   Putty roll    yellow Yellow      Putty     Yellow, 2 minutes yellow                HEP/  Patient education topics  wear splint when outside especially until PA discharges it.  Issued abby straps for RF to MF. Issued Large digisleeve Issued yellow -correction: putty Edema control with digisleeve Use of digi sleeve (still not observed on nor with any signs of its use) Repeated importance of use of digit sleeve on finger   Therapeutic Activity          In-hand manipulation activities Large beads, small balls, deck of cards, dog toy, cotton ball mix, pen/marker rolls, small sponge pieces Small rods, medium beads, cotton ball mix, vibrating ball, dog toy rotation Stones, coins, small deck of cards Stones stacking Flexbar rotation, vibrating ball on high Flexbar rotation, vibrating ball on high   Graded grasp Rice marble sifting Barley and bean transfers [place] rice sifting  True balance True Balance; grasp and squeeze water ball, transferring warm water   Bulk putty: , pull, pinch and search    yellow yellow yellow   Finger extension activities    Golf ball on game tray Tennis ball on Frisbee and golf ball on game tray Lacrosse ball on game tray; yellow putty                              Self care training          Objective measurements taken and reviewed with patient  see above  See above  See above    Neuromuscular Re-education                                              Orthotic fitting and training         Taping Rock tape applied in modified finger web to dorsum of LRF        Modalities         X= performed this date as previously outlined    HEP  On initial eval, patient education was provided on exam findings, treatment diagnosis, treatment plan, expectations, and prognosis. Patient was also provided recommendations for use of heat.    HEP Date Issued Date modified Date discharged Comments    Tendon glides 3/11/24      Finger add/abd  3/11/24      Towel scrunches 3/11/24      Finger opp 3/11/24      Putty roll and grasp 3/28/24            ASSESSMENT  Patient presents with improving activity tolerance, but still with mild edema in the LRF.  Inconsistent use of digi sleeve is likely culprit of edema persisting.      Goals: (to be met in 10 visits)  Patient will be independent and compliant with comprehensive HEP to maintain progress achieved in OT. (Progressing)    Patient will present with increase in left digit 2-5 WHITT to 200 to allow for ease with gripping toothbrush. (Progressing)  Patient will report no more than 1/10 pain in left hand during self care activities. (Progressing)  Patient will present with  strength in the left hand to that of 40% of the contralateral hand in order to be able to perform housework. (Met)  Patient will present with improved FMC in the left hand sufficient for crafting.(Progressing)    PLAN:  Frequency / Duration: Patient will be seen for 2 x/week or a total of 10 visits over a 90 day period.  Treatment will include: Manual Therapy, Neuromuscular Re-education, Self-Care Home Management, Therapeutic Activities, Therapeutic Exercise, Home Exercise Program instruction, Modalities to include: Ultrasound and FT, and paraffin  taping, and custom splinting.    Next session: FT, bulk putty; lift, carry laundry basket; begin transition to HEP    Charges:  2 TA, 1 TE   Total Timed Treatment: 43 minutes    Total Treatment Time: 43 minutes  WELLINGTON Rene, OTR/L, CHT

## 2024-04-10 ENCOUNTER — TELEPHONE (OUTPATIENT)
Dept: FAMILY MEDICINE CLINIC | Facility: CLINIC | Age: 59
End: 2024-04-10

## 2024-04-10 NOTE — TELEPHONE ENCOUNTER
Med rec scan printed yesterday.  Yoana did you receive order and visit notes  for PET scan from  [facility]?Please advise

## 2024-04-10 NOTE — TELEPHONE ENCOUNTER
I have access to this in [facility] so not sure if we really needed her to delay her scan, but just FYI, I never received any of the previous messages regarding her need for referral or authorization of her PET scan. I was totally in the dark about all of this going on. Next time, feel free to forward this to me, even just as an FYI so I can stay in the loop. Thanks.

## 2024-04-11 ENCOUNTER — TELEPHONE (OUTPATIENT)
Dept: ORTHOPEDICS CLINIC | Facility: CLINIC | Age: 59
End: 2024-04-11

## 2024-04-11 ENCOUNTER — OFFICE VISIT (OUTPATIENT)
Dept: OCCUPATIONAL MEDICINE | Age: 59
End: 2024-04-11
Attending: PHYSICIAN ASSISTANT
Payer: COMMERCIAL

## 2024-04-11 PROCEDURE — 97110 THERAPEUTIC EXERCISES: CPT

## 2024-04-11 PROCEDURE — 97530 THERAPEUTIC ACTIVITIES: CPT

## 2024-04-11 NOTE — TELEPHONE ENCOUNTER
lvm for pt to cb to reschedule her appointment for today, pt is 2 weeks early and provider would like to see her in two weeks.

## 2024-04-11 NOTE — PROGRESS NOTES
Diagnosis:        Closed displaced fracture of phalanx of left ring finger, unspecified phalanx, initial encounter (S62.605A) Referring Provider: David  Date of Evaluation:    3/11/24    Precautions:  None per orders Next MD/PA visit: 4/11/24  DOI: 2/4/24  Date of Surgery: n/a   Insurance Primary/Secondary[place] / N/A     # Auth Visits: 10           Orders:     Order Date:  3/5/2024  Authorizing Provider:   LAURA KOWALSKI     Procedure:  OP REFERRAL TO [facility] [[id number]]         Order #:   [id number]  Qty:  1     Priority:  Routine                   Class:   IHP - RFL     Standing Interval:          Standing Occurrences:          Expires on:            Expected by:    Associated DX:  Closed displaced fracture of phalanx of left ring finger, unspecified phalanx, initial encounter (S62.605A)     Order summary:  IHP - RFL, Routine, 8 visits  Occupational  Therapy Area of Concentration: Orthopedic  Occupational Therapy Ortho: UE  If the patient can be seen sooner with a PT vs an OT, can we cancel this order and replace with a PT order? No         Comments:  Gentle rom , no strengthening  2x/week 6 weeks    SUBJECTIVE:  No new complaints.    Pain 0/10      OBJECTIVE:      OUTCOME MEASURE   On Initial Evaluation:  QuickDASH Outcome Score  Score: 70.45 % (3/11/2024  6:15 PM)      Interim  Post QuickDASH Outcome Score  Post Score: 63.64 % (3/28/2024  9:43 AM)  6.81 % improvement        SENSORY: WNL    Wrist AROM: extension 60, flexion 45    Date:  3/11/24 3/28/24   Edema Girth measurements (cm)  Right/Left  left left   Digit: RF P1  PIP  P2  DIP  P3 7.5  7.1  5.5  4.8  4.7 7.2  6.6  5.3  4.8  4.5     DATE: 3/11/24 Left  Digital AROM (°) Index Finger Middle Finger Ring Finger Small Finger   MP Ext/Flex 0/55 0/70 0/40 0/60   PIP Ext/Flex 0/95 0/90 15/65 0/60   DIP Ext/Flex 0/30 0/50 0/30 0/45   WHITT 180 210 120 165     Date: 3/20/24  LEFT  Digital AROM (°)  Index Finger  Middle Finger  Ring  Finger  Small Finger    MP Ext/Flex  0/70 0/75 0/55 0/70   PIP Ext/Flex  0/100 0/95 15/80 0/75   DIP Ext/Flex  0/65 0/60 +5/45 0/65   WHITT  235 230 160 210       Date: 3/28/24  LEFT  Digital AROM (°)  Index Finger  Middle Finger  Ring Finger  Small Finger    MP Ext/Flex  0/60 0/75 0/55 0/65   PIP Ext/Flex  0/100 0/95 15/80 0/90   DIP Ext/Flex  0/50 0/45 +5/40 0/65   WHITT  210 215 155 220       Date: 4/9/24  LEFT  Digital AROM (°)  Index Finger  Middle Finger  Ring Finger  Small Finger    MP Ext/Flex  0/65 0/70 0/60 0/70   PIP Ext/Flex  0/105 0/100 10/75 0/85   DIP Ext/Flex  0/50 0/60 +5/45 0/65   WHITT  220 230 165 220          Strength (lbs)  Right/Left Right  3/11/24 Left   3/11/24 Right/left 4/9/24    NT NT 50/35   3 Point Pinch  NT NT N/a   Lateral Pinch NT NT N/a   NT= Not tested due to acuity             Date 3/22/24 3/28/24 4/4/24 4/9/24 4/11/24 4/16/24   Visit # 4 5 6 7 8 9   # of visits authorized: HMO 5 5 10 10 10 10   # of visits in OT POC:  10 10 10 10 10 10   Evaluation         Progress Report written  x       Manual Therapy         MEM         IASTM                   Ther ex          tendon glides x x x x x x   Finger add/abd x x x x x x   Fluidotherapy Fluidotherapy for 10 minutes to left hand, with AROM x digits performed. Fluidotherapy for 10 minutes to left hand, with AROM x digits performed. Fluidotherapy for 10 minutes to left hand, with AROM x digits performed. Fluidotherapy for 10 minutes to left hand, with AROM x digits performed. Fluidotherapy for 10 minutes to left hand, with AROM x digits performed. Fluidotherapy for 10 minutes to left hand, with AROM x digits performed.    reverse blocking 10x         intrinsic activation Finger add/abd with cotton ball mix  Finger add/abd with cotton ball mix; lumbrical gripper 1 with cotton ball mix Finger add/abd with cotton ball mix; lumbrical gripper 1 with cotton ball mix Finger add/abd with cotton ball mix; lumbrical gripper 1 with yellow sponge  insert Lumbrical  into red power web; lumbrical gripper 1 with pink sponge insert   Putty roll  yellow Yellow    yellow   Putty   Yellow, 2 minutes yellow   yellow             HEP/  Patient education topics Issued Large digisleeve Issued yellow -correction: putty Edema control with digisleeve Use of digi sleeve (still not observed on nor with any signs of its use) Repeated importance of use of digit sleeve on finger OT POC   Therapeutic Activity         In-hand manipulation activities Small rods, medium beads, cotton ball mix, vibrating ball, dog toy rotation Stones, coins, small deck of cards Stones stacking Flexbar rotation, vibrating ball on high Flexbar rotation, vibrating ball on high Red Flexbar rotation, vibrating ball on high   Graded grasp Barley and bean transfers [place] rice sifting  True balance True Balance; grasp and squeeze water ball, transferring warm water True balance, red flexbar   Bulk putty: , pull, pinch and search   yellow yellow yellow yellow   Finger extension activities   Golf ball on game tray Tennis ball on Frisbee and golf ball on game tray Lacrosse ball on game tray; yellow putty Lacrosse ball on game tray   Lift, carry      5# in tote bag; 5# plus pan and hair dryer in laundry basket                     Self care training          Objective measurements taken and reviewed with patient  See above  See above     Neuromuscular Re-education                                             Orthotic fitting and training         Taping         Modalities         X= performed this date as previously outlined    HEP  On initial eval, patient education was provided on exam findings, treatment diagnosis, treatment plan, expectations, and prognosis. Patient was also provided recommendations for use of heat.    HEP Date Issued Date modified Date discharged Comments    Tendon glides 3/11/24      Finger add/abd 3/11/24      Towel scrunches 3/11/24      Finger opp 3/11/24      Putty roll and  grasp 3/28/24            ASSESSMENT  Patient presents with improving use of the left hand and RF.  Mild edema persists but appears more controlled with digi sleeve today. Anticipate she will be ready for planned dc next session.      Goals: (to be met in 10 visits)  Patient will be independent and compliant with comprehensive HEP to maintain progress achieved in OT. (Progressing)    Patient will present with increase in left digit 2-5 WHITT to 200 to allow for ease with gripping toothbrush. (Progressing)  Patient will report no more than 1/10 pain in left hand during self care activities. (Progressing)  Patient will present with  strength in the left hand to that of 40% of the contralateral hand in order to be able to perform housework. (Met)  Patient will present with improved FMC in the left hand sufficient for crafting.(Progressing)    PLAN:  Frequency / Duration: Patient will be seen for 2 x/week or a total of 10 visits over a 90 day period.  Treatment will include: Manual Therapy, Neuromuscular Re-education, Self-Care Home Management, Therapeutic Activities, Therapeutic Exercise, Home Exercise Program instruction, Modalities to include: Ultrasound and FT, and paraffin  taping, and custom splinting.    Next session: transition to HEP    Charges:  2 TA, 1 TE   Total Timed Treatment: 45 minutes    Total Treatment Time: 45 minutes  WELLINGTON Rene, OTR/L, CHT

## 2024-04-16 ENCOUNTER — OFFICE VISIT (OUTPATIENT)
Dept: OCCUPATIONAL MEDICINE | Age: 59
End: 2024-04-16
Attending: PHYSICIAN ASSISTANT
Payer: COMMERCIAL

## 2024-04-16 PROCEDURE — 97530 THERAPEUTIC ACTIVITIES: CPT

## 2024-04-16 PROCEDURE — 97110 THERAPEUTIC EXERCISES: CPT

## 2024-04-16 NOTE — PROGRESS NOTES
Mathieu  Pt has attended 10 visits in Occupational Therapy.     Diagnosis:        Closed displaced fracture of phalanx of left ring finger, unspecified phalanx, initial encounter (S62.608U) Referring Provider: David  Date of Evaluation:    3/11/24    Precautions:  None per orders Next MD/PA visit: 4/25/24  DOI: 2/4/24  Date of Surgery: n/a   Insurance Primary/Secondary[place] / N/A     # Auth Visits: 10           Orders:     Order Date:  3/5/2024  Authorizing Provider:   LAURA KOWALSKI     Procedure:  OP REFERRAL TO [facility] [[id number]]         Order #:   [id number]  Qty:  1     Priority:  Routine                   Class:   IHP - RFL     Standing Interval:          Standing Occurrences:          Expires on:            Expected by:    Associated DX:  Closed displaced fracture of phalanx of left ring finger, unspecified phalanx, initial encounter (S62.605A)     Order summary:  IHP - RFL, Routine, 8 visits  Occupational  Therapy Area of Concentration: Orthopedic  Occupational Therapy Ortho: UE  If the patient can be seen sooner with a PT vs an OT, can we cancel this order and replace with a PT order? No         Comments:  Gentle rom , no strengthening  2x/week 6 weeks    SUBJECTIVE:  Reports some difficulty with grasping and manipulating small objects.    Pain 0/10      OBJECTIVE:      OUTCOME MEASURE   On Initial Evaluation:  QuickDASH Outcome Score  Score: 70.45 % (3/11/2024  6:15 PM)      Interim  Post QuickDASH Outcome Score  Post Score: 63.64 % (3/28/2024  9:43 AM)  6.81 % improvement      Discharge  Post QuickDASH Outcome Score  Post Score: 34.09 % (4/18/2024  9:49 AM)  36.36 % improvement        Date:  3/11/24 3/28/24 4/18/24   Edema Girth measurements (cm)  Right/Left  left left left   Digit: RF P1  PIP  P2  DIP  P3 7.5  7.1  5.5  4.8  4.7 7.2  6.6  5.3  4.8  4.5 7.0  6.5  5.5  5.0  4.7     DATE: 3/11/24 Left  Digital AROM (°) Index Finger Middle Finger Ring Finger  Small Finger   MP Ext/Flex 0/55 0/70 0/40 0/60   PIP Ext/Flex 0/95 0/90 15/65 0/60   DIP Ext/Flex 0/30 0/50 0/30 0/45   WHITT 180 210 120 165     Date: 3/20/24  LEFT  Digital AROM (°)  Index Finger  Middle Finger  Ring Finger  Small Finger    MP Ext/Flex  0/70 0/75 0/55 0/70   PIP Ext/Flex  0/100 0/95 15/80 0/75   DIP Ext/Flex  0/65 0/60 +5/45 0/65   WHITT  235 230 160 210       Date: 3/28/24  LEFT  Digital AROM (°)  Index Finger  Middle Finger  Ring Finger  Small Finger    MP Ext/Flex  0/60 0/75 0/55 0/65   PIP Ext/Flex  0/100 0/95 15/80 0/90   DIP Ext/Flex  0/50 0/45 +5/40 0/65   WHITT  210 215 155 220       Date: 4/9/24  LEFT  Digital AROM (°)  Index Finger  Middle Finger  Ring Finger  Small Finger    MP Ext/Flex  0/65 0/70 0/60 0/70   PIP Ext/Flex  0/105 0/100 10/75 0/85   DIP Ext/Flex  0/50 0/60 +5/45 0/65   WHITT  220 230 165 220       Date: 4/18/24  LEFT  Digital AROM (°)  Index Finger  Middle Finger  Ring Finger  Small Finger    MP Ext/Flex  0/65 0/80 0/65 0/70   PIP Ext/Flex  0/100 0/95 10/90 0/85   DIP Ext/Flex  0/60 0/55 +5/45 0/70   WHITT  225 230 185 225          Strength (lbs)  Right/Left Right  3/11/24 Left   3/11/24 Right/left 4/9/24 Left 4/18/24    NT NT 50/35 35   3 Point Pinch  NT NT N/a -   Lateral Pinch NT NT N/a -   NT= Not tested due to acuity             Date 3/28/24 4/4/24 4/9/24 4/11/24 4/16/24 4/18/24   Visit # 5 6 7 8 9 10   # of visits authorized: HMO 5 10 10 10 10 10   # of visits in OT POC:  10 10 10 10 10 10   Evaluation         Progress Report written x        Manual Therapy         MEM         IASTM                   Ther ex          tendon glides x x x x x x   Finger add/abd x x x x x x   Fluidotherapy Fluidotherapy for 10 minutes to left hand, with AROM x digits performed. Fluidotherapy for 10 minutes to left hand, with AROM x digits performed. Fluidotherapy for 10 minutes to left hand, with AROM x digits performed. Fluidotherapy for 10 minutes to left hand, with AROM x digits  performed. Fluidotherapy for 10 minutes to left hand, with AROM x digits performed. Fluidotherapy for 10 minutes to left hand, with AROM x digits performed.    reverse blocking          intrinsic activation  Finger add/abd with cotton ball mix; lumbrical gripper 1 with cotton ball mix Finger add/abd with cotton ball mix; lumbrical gripper 1 with cotton ball mix Finger add/abd with cotton ball mix; lumbrical gripper 1 with yellow sponge insert Lumbrical  into red power web; lumbrical gripper 1 with pink sponge insert Lumbrical  into green power web   Putty roll yellow Yellow    yellow tan   Putty  Yellow, 2 minutes yellow   yellow tan   flexbar      Red,  and twist    HEP/  Patient education topics Issued yellow -correction: putty Edema control with digisleeve Use of digi sleeve (still not observed on nor with any signs of its use) Repeated importance of use of digit sleeve on finger OT POC Final HEP   Therapeutic Activity         In-hand manipulation activities Stones, coins, small deck of cards Stones stacking Flexbar rotation, vibrating ball on high Flexbar rotation, vibrating ball on high Red Flexbar rotation, vibrating ball on high Red flexbar, vibrating ball on high   Graded grasp [place] rice sifting  True balance True Balance; grasp and squeeze water ball, transferring warm water True balance, red flexbar    Bulk putty: , pull, pinch and search  yellow yellow yellow yellow Tan    Finger extension activities  Golf ball on game tray Tennis ball on Frisbee and golf ball on game tray Lacrosse ball on game tray; yellow putty Lacrosse ball on game tray Lacrosse ball on game tray   Lift, carry     5# in tote bag; 5# plus pan and hair dryer in laundry basket                      Self care training          Objective measurements taken and reviewed with patient See above  See above   See above   Neuromuscular Re-education                                             Orthotic fitting and training          Taping         Modalities         X= performed this date as previously outlined    HEP  On initial eval, patient education was provided on exam findings, treatment diagnosis, treatment plan, expectations, and prognosis. Patient was also provided recommendations for use of heat.    HEP Date Issued Date modified Date discharged Comments    Tendon glides 3/11/24      Finger add/abd 3/11/24      Towel scrunches 3/11/24      Finger opp 3/11/24      Putty roll and grasp 3/28/24            ASSESSMENT  Patient is a 59 yo female, who has been seen in Occupational Therapy since initial eval on 3/11/24, with last treatment session on 4/18/24.  The patient has attended 10/10 scheduled appointments, with 0 no shows and 0 cancellations.  Focus of skilled OT has been on ROM, edema, and strength, with use of interventions including therapeutic activities, therapeutic exercises, modalities such as FT/superficial heating, manual therapy, adapted ADL training, and taping to achieve the functional goals listed below. She has shown improvement in ROM, edema and strength, with functional improvements reported as in food prep, crafting activities, and in daily occupation engagement.  Patient rated Outcome measure of the Quick DASH indicates improvement as well.  Limitations and barriers toward progress include: deformity of healed bone causing slight rotational component.  She has met the goals as noted below, reaching maximal benefit from skilled OT, and is ready for transition to home program at this time.        Goals: (to be met in 10 visits)  Patient will be independent and compliant with comprehensive HEP to maintain progress achieved in OT. (met)    Patient will present with increase in left digit 2-5 WHITT to 200 to allow for ease with gripping toothbrush. (Met for all but RF, but improved to gross )  Patient will report no more than 1/10 pain in left hand during self care activities. (met)  Patient will present with   strength in the left hand to that of 40% of the contralateral hand in order to be able to perform housework. (Met)  Patient will present with improved FMC in the left hand sufficient for crafting.(met)    PLAN:  Discharge OT and continue with HEP    Charges:  2 TA, 1 TE   Total Timed Treatment: 45 minutes    Total Treatment Time: 45 minutes  WELLINGTON Rene, OTR/L, CHT

## 2024-04-18 ENCOUNTER — OFFICE VISIT (OUTPATIENT)
Dept: OCCUPATIONAL MEDICINE | Age: 59
End: 2024-04-18
Attending: PHYSICIAN ASSISTANT
Payer: COMMERCIAL

## 2024-04-18 PROCEDURE — 97110 THERAPEUTIC EXERCISES: CPT

## 2024-04-18 PROCEDURE — 97530 THERAPEUTIC ACTIVITIES: CPT

## 2024-04-23 ENCOUNTER — TELEPHONE (OUTPATIENT)
Facility: LOCATION | Age: 59
End: 2024-04-23

## 2024-04-24 ENCOUNTER — HOSPITAL ENCOUNTER (OUTPATIENT)
Dept: MAMMOGRAPHY | Age: 59
Discharge: HOME | End: 2024-04-24
Attending: PHYSICIAN ASSISTANT
Payer: COMMERCIAL

## 2024-04-24 ENCOUNTER — OFFICE VISIT (OUTPATIENT)
Dept: FAMILY MEDICINE CLINIC | Facility: CLINIC | Age: 59
End: 2024-04-24
Payer: COMMERCIAL

## 2024-04-24 VITALS
HEIGHT: 62 IN | DIASTOLIC BLOOD PRESSURE: 68 MMHG | WEIGHT: 159 LBS | SYSTOLIC BLOOD PRESSURE: 112 MMHG | HEART RATE: 72 BPM | BODY MASS INDEX: 29.26 KG/M2 | OXYGEN SATURATION: 98 % | RESPIRATION RATE: 16 BRPM

## 2024-04-24 DIAGNOSIS — Z90.722 STATUS POST TOTAL ABDOMINAL HYSTERECTOMY AND BILATERAL SALPINGO-OOPHORECTOMY (TAH-BSO): ICD-10-CM

## 2024-04-24 DIAGNOSIS — Z90.710 STATUS POST TOTAL ABDOMINAL HYSTERECTOMY AND BILATERAL SALPINGO-OOPHORECTOMY (TAH-BSO): ICD-10-CM

## 2024-04-24 DIAGNOSIS — C53.0 CANCER OF ENDOCERVIX (HCC): Primary | ICD-10-CM

## 2024-04-24 DIAGNOSIS — Z12.31 VISIT FOR SCREENING MAMMOGRAM: ICD-10-CM

## 2024-04-24 DIAGNOSIS — Z90.79 STATUS POST TOTAL ABDOMINAL HYSTERECTOMY AND BILATERAL SALPINGO-OOPHORECTOMY (TAH-BSO): ICD-10-CM

## 2024-04-24 DIAGNOSIS — C53.9 MALIGNANT NEOPLASM OF CERVIX, UNSPECIFIED SITE (HCC): ICD-10-CM

## 2024-04-24 PROCEDURE — 3078F DIAST BP <80 MM HG: CPT | Performed by: FAMILY MEDICINE

## 2024-04-24 PROCEDURE — 77067 SCR MAMMO BI INCL CAD: CPT | Performed by: PHYSICIAN ASSISTANT

## 2024-04-24 PROCEDURE — 77063 BREAST TOMOSYNTHESIS BI: CPT | Performed by: PHYSICIAN ASSISTANT

## 2024-04-24 PROCEDURE — 3074F SYST BP LT 130 MM HG: CPT | Performed by: FAMILY MEDICINE

## 2024-04-24 PROCEDURE — 99213 OFFICE O/P EST LOW 20 MIN: CPT | Performed by: FAMILY MEDICINE

## 2024-04-24 PROCEDURE — 3008F BODY MASS INDEX DOCD: CPT | Performed by: FAMILY MEDICINE

## 2024-04-24 NOTE — PROGRESS NOTES
[facility] Progress Note    SUBJECTIVE: Augustina Baugh 58 year old female is here today for   Chief Complaint   Patient presents with    Referral     Needs one for pet scan at [facility]       Here as needs surveillance for for cervical cancer screen.    Had mammogram today    Colonoscopy scheduled for next month.    PMH  No past medical history on file.     PSH  No past surgical history on file.     Social Hx:  No changes    ROS  See HPI    OBJECTIVE:  /68   Pulse 72   Resp 16   Ht 5' 2\" (1.575 m)   Wt 159 lb (72.1 kg)   LMP  (LMP Unknown)   SpO2 98%   BMI 29.08 kg/m²           Labs:          Meds:   Current Outpatient Medications   Medication Sig Dispense Refill    acetaminophen-codeine (TYLENOL WITH CODEINE #3) 300-30 MG Oral Tab Take 1 tablet by mouth every 4 (four) hours as needed for Pain. 30 tablet 0    gabapentin 100 MG Oral Cap       ascorbic acid 500 MG Oral Tab Take 1 tablet (500 mg total) by mouth 3 (three) times daily.      HYDROcodone-acetaminophen 5-325 MG Oral Tab Take 1 tablet by mouth.      ibuprofen 800 MG Oral Tab Take 1 tablet (800 mg total) by mouth.      vitamin E 1000 UNITS Oral Cap Take 1 capsule (1,000 Units total) by mouth.           Assessment/Plan  Augustina was seen today for referral.    Diagnoses and all orders for this visit:    Cancer of endocervix (HCC)  -     PET STANDARD BODY SCAN (ONCOLOGY) (CPT=78815); Future    Malignant neoplasm of cervix, unspecified site (HCC)  -     PET STANDARD BODY SCAN (ONCOLOGY) (CPT=78815); Future    Status post total abdominal hysterectomy and bilateral salpingo-oophorectomy (NITO-BSO)         Needs to get PET/CT setup for surveillance of cancer after treatment. Will order external and will have to see if referral dept can process, or if needs to occur in system perhaps we can get order through here.         Total Time spent with patient and coordinating care:  15 minutes.    Follow up: as needed      Lm Rosales MD

## 2024-04-25 ENCOUNTER — OFFICE VISIT (OUTPATIENT)
Dept: ORTHOPEDICS CLINIC | Facility: CLINIC | Age: 59
End: 2024-04-25
Payer: COMMERCIAL

## 2024-04-25 VITALS — HEIGHT: 62 IN | BODY MASS INDEX: 29.26 KG/M2 | WEIGHT: 159 LBS

## 2024-04-25 DIAGNOSIS — S62.605A CLOSED DISPLACED FRACTURE OF PHALANX OF LEFT RING FINGER, UNSPECIFIED PHALANX, INITIAL ENCOUNTER: Primary | ICD-10-CM

## 2024-04-25 PROCEDURE — 3008F BODY MASS INDEX DOCD: CPT | Performed by: PHYSICIAN ASSISTANT

## 2024-04-25 PROCEDURE — 99212 OFFICE O/P EST SF 10 MIN: CPT | Performed by: PHYSICIAN ASSISTANT

## 2024-04-25 NOTE — PROGRESS NOTES
Clinic Note [facility]     Assessment/Plan:  58 year old female    Left hand left ring finger proximal phalanx fracture, minimally displaced-she does have slight rotation of the finger which I think will normalize as she continues to get full motion.  We discussed surgery for that should not get better but she is not interested in that.  She will continue working on range of motion exercises on her own I do think this will continue to improve.      ICD-10-CM    1. Closed displaced fracture of phalanx of left ring finger, unspecified phalanx, initial encounter  S62.605A              Follow Up: 4 weeks with no need for x-ray    Diagnostic Studies:  X-rays are negative for acute fractures occasions of    Physical Exam:    Ht 5' 2\" (1.575 m)   Wt 159 lb (72.1 kg)   LMP  (LMP Unknown)   BMI 29.08 kg/m²     Constitutional: NAD. AOx3. Well-developed and Well-nourished.   Psychiatric: Normal mood/ affect/ behavior. Judgment and thought content normal.     Left upper Extremity:   Inspection: Skin Intact. No skin lesions. No gross deformity.   Palpation: Nontender palpation of the fracture site   Motion: Elbow: normal bilateral symmetric ext/flex  Wrist: normal bilateral symmetric ext/flex/sup/pro  Finger: full composite fist, PIP extension/flexion 20/70, passively full extension and flexion with slight rotation        CC: Follow - Up (LT RING FINGER FX)        HPI: This 58 year old female presents with complaints of left ring finger pain.  She states on 2/2/2024 her finger stuck in a dog collar and her finger went the opposite direction she pushed it back in place.  Her pain is improved since then.  She is been an ulnar gutter brace.  She is here for further evaluation    Interval history: Patient states that she is in therapy working on range of motion exercises.  Should her pain is a lot better    No past medical history on file.  No past surgical history on file.  Current Outpatient Medications   Medication Sig  Dispense Refill    acetaminophen-codeine (TYLENOL WITH CODEINE #3) 300-30 MG Oral Tab Take 1 tablet by mouth every 4 (four) hours as needed for Pain. 30 tablet 0    gabapentin 100 MG Oral Cap       ascorbic acid 500 MG Oral Tab Take 1 tablet (500 mg total) by mouth 3 (three) times daily.      HYDROcodone-acetaminophen 5-325 MG Oral Tab Take 1 tablet by mouth.      ibuprofen 800 MG Oral Tab Take 1 tablet (800 mg total) by mouth.      vitamin E 1000 UNITS Oral Cap Take 1 capsule (1,000 Units total) by mouth.       No Known Allergies  Family History   Problem Relation Age of Onset    Cancer Self 55        cervical     Social History     Occupational History    Not on file   Tobacco Use    Smoking status: Never    Smokeless tobacco: Never   Vaping Use    Vaping status: Never Used   Substance and Sexual Activity    Alcohol use: Not Currently    Drug use: Never    Sexual activity: Not Currently        Review of Systems (negative unless bolded):  General: fevers, chills, fatigue  CV:  chest pain, palpitations, leg swelling  Msk: bodyaches, neck pain, neck stiffness  Skin: rashes, open wounds, nonhealing ulcers  Hem: bleeds easily, bruise easily, immunocompromised  Neuro: dizziness, light headedness, headaches  Psych: anxious, depressed, anger issues      Holly Nj PA-C  Hand, Wrist, & Elbow Surgery  Physician Assistant to Dr. Wade Enriquez  [facility]  [address] [place] [address], [address], [email or web address]  [email or web address]  t: [phone]  f: [phone]

## 2024-05-10 ENCOUNTER — TELEPHONE (OUTPATIENT)
Dept: FAMILY MEDICINE CLINIC | Facility: CLINIC | Age: 59
End: 2024-05-10

## 2024-05-10 NOTE — TELEPHONE ENCOUNTER
Paged by Soo regarding patient's PET scan order that was recently placed.  Wondering if this is approved yet.  Based on the order itself I cannot tell if this is approved by insurance.  I called the referrals department but they closed on Fridays at 4:30 PM.  Per chart review it looks like social work from another hospital has tried reaching out without success.  Another option would be to have patient contact her insurance directly to see if this is approved.  We can provide the CPT code in order number if needed.

## 2024-05-14 ENCOUNTER — TELEPHONE (OUTPATIENT)
Dept: CASE MANAGEMENT | Age: 59
End: 2024-05-14

## 2024-05-14 DIAGNOSIS — C53.0 MALIGNANT NEOPLASM OF ENDOCERVIX (HCC): Primary | ICD-10-CM

## 2024-05-14 NOTE — TELEPHONE ENCOUNTER
So at this point our goal would be to get the order to our system we just need to make sure the order is exactly as oncology from [facility] would desired    Lm Rosales MD

## 2024-05-14 NOTE — TELEPHONE ENCOUNTER
Hello     We have received correspondence regarding request for PET scan([id number]). The health plan notes that there is not sufficient clinical documentation to substantiate the use of tertiary services.     The health plan notes that the patient can use in network facilities  [facility]     Please advise plan of care.     Thank you,  Jory  Referral specialist    There is not sufficient clinical documentation to substantiate the use of tertiary services. Please refer this member to an in-network facility such as [facility] or [facility] for testing and PET scan. Per [facility] Guidelines regarding out of network/tertiary services, a letter of medical necessity verifying the rarity of this member's illness that precludes treatment within the [facility] network is required to support the use of an out of network or tertiary facility and will be subject to P Medical Director review. If you need assistance finding an in-network provider, please contact customer service at [phone]. Please utilize the [employer] website to find an in-, submit a new referral, check claims status, and more. If you intend to pursue this request, please submit a new request and provide supporting clinical documentation to indicate medical necessity for the use of a tertiary provider. This request will be closed 5/15/24 to comply with timeliness standards set by [employer].?

## 2024-05-14 NOTE — TELEPHONE ENCOUNTER
LM for pt, does she have order from [facility]?     Called for order- nurse states needs pet scan skull to mid thigh.     [facility]  LAMBERTO WRIGHT  [address] [address] · [place]  [phone]      Order placed- LM for pt to schedule with Edward    Once results are in, please fax to [phone]  Pt will also need to go get the CD from [facility] for Dr. Wright

## 2024-05-14 NOTE — TELEPHONE ENCOUNTER
It is for a PET scan, I believe I have it ordered as a referral, but we just want to make sure if ordered here it is done properly, if they can have their provider send us the order that could work, or just tell us exactly what needs to be done.    The order in the other system is PT With CT Skull To Mid Thigh , but I don't seem to have have that exact option    Lm Rosales MD

## 2024-05-14 NOTE — TELEPHONE ENCOUNTER
FYI- see message from referrals. PET scan use at [facility] not approved.   Please advise on next steps

## 2024-05-14 NOTE — TELEPHONE ENCOUNTER
LM for pt, does she have order from [facility] for PET scan? What scan do they want done? Per pt's insurance she needs to get this done at [place].

## 2024-05-20 ENCOUNTER — TELEPHONE (OUTPATIENT)
Facility: LOCATION | Age: 59
End: 2024-05-20

## 2024-05-20 DIAGNOSIS — Z12.11 ENCOUNTER FOR SCREENING COLONOSCOPY: Primary | ICD-10-CM

## 2024-05-23 ENCOUNTER — HOSPITAL ENCOUNTER (OUTPATIENT)
Dept: NUCLEAR MEDICINE | Facility: HOSPITAL | Age: 59
Discharge: HOME | End: 2024-05-23
Attending: FAMILY MEDICINE

## 2024-05-23 DIAGNOSIS — C53.0 MALIGNANT NEOPLASM OF ENDOCERVIX (HCC): ICD-10-CM

## 2024-05-23 LAB — GLUCOSE BLD-MCNC: 113 MG/DL (ref 70–99)

## 2024-05-23 PROCEDURE — 82962 GLUCOSE BLOOD TEST: CPT

## 2024-05-23 PROCEDURE — 78815 PET IMAGE W/CT SKULL-THIGH: CPT | Performed by: FAMILY MEDICINE

## 2024-07-24 ENCOUNTER — TELEPHONE (OUTPATIENT)
Dept: FAMILY MEDICINE CLINIC | Facility: CLINIC | Age: 59
End: 2024-07-24

## 2024-07-24 DIAGNOSIS — C53.9 MALIGNANT NEOPLASM OF CERVIX, UNSPECIFIED SITE (HCC): Primary | ICD-10-CM

## 2024-07-24 DIAGNOSIS — G44.52 NEW DAILY PERSISTENT HEADACHE: ICD-10-CM

## 2024-07-24 NOTE — TELEPHONE ENCOUNTER
Hx malignant neoplasm cervix.  Patient is in need of STAT MRI Brain . Reporting frequent debilitating headaches.   Please advise..   Fax: [phone] attn:Aidee

## 2024-07-24 NOTE — TELEPHONE ENCOUNTER
Requesting MRI Brain, w/wo contrast - referral/authorization - pls call to discuss.  Pt dx with cervical cx.  [facility] said this is urgent and needs to be STAT.  They cannot order or get authorization due to pt's HMO insurance.

## 2024-08-28 ENCOUNTER — HOSPITAL ENCOUNTER (OUTPATIENT)
Facility: HOSPITAL | Age: 59
Setting detail: HOSPITAL OUTPATIENT SURGERY
Discharge: HOME | End: 2024-08-28
Attending: SURGERY | Admitting: SURGERY
Payer: COMMERCIAL

## 2024-08-28 ENCOUNTER — ANESTHESIA (OUTPATIENT)
Dept: ENDOSCOPY | Facility: HOSPITAL | Age: 59
End: 2024-08-28
Payer: COMMERCIAL

## 2024-08-28 ENCOUNTER — ANESTHESIA EVENT (OUTPATIENT)
Dept: ENDOSCOPY | Facility: HOSPITAL | Age: 59
End: 2024-08-28
Payer: COMMERCIAL

## 2024-08-28 ENCOUNTER — APPOINTMENT (OUTPATIENT)
Dept: CT IMAGING | Facility: HOSPITAL | Age: 59
End: 2024-08-28
Attending: SURGERY
Payer: COMMERCIAL

## 2024-08-28 VITALS
HEIGHT: 62 IN | RESPIRATION RATE: 20 BRPM | SYSTOLIC BLOOD PRESSURE: 109 MMHG | DIASTOLIC BLOOD PRESSURE: 54 MMHG | OXYGEN SATURATION: 100 % | HEART RATE: 58 BPM | WEIGHT: 161 LBS | BODY MASS INDEX: 29.63 KG/M2 | TEMPERATURE: 98 F

## 2024-08-28 DIAGNOSIS — Z12.11 ENCOUNTER FOR SCREENING COLONOSCOPY: ICD-10-CM

## 2024-08-28 PROCEDURE — 74176 CT ABD & PELVIS W/O CONTRAST: CPT | Performed by: SURGERY

## 2024-08-28 PROCEDURE — 0DJD8ZZ INSPECTION OF LOWER INTESTINAL TRACT, VIA NATURAL OR ARTIFICIAL OPENING ENDOSCOPIC: ICD-10-PCS | Performed by: SURGERY

## 2024-08-28 RX ORDER — SODIUM CHLORIDE, SODIUM LACTATE, POTASSIUM CHLORIDE, CALCIUM CHLORIDE 600; 310; 30; 20 MG/100ML; MG/100ML; MG/100ML; MG/100ML
INJECTION, SOLUTION INTRAVENOUS CONTINUOUS
Status: DISCONTINUED | OUTPATIENT
Start: 2024-08-28 | End: 2024-08-28

## 2024-08-28 RX ORDER — LIDOCAINE HYDROCHLORIDE 10 MG/ML
INJECTION, SOLUTION EPIDURAL; INFILTRATION; INTRACAUDAL; PERINEURAL AS NEEDED
Status: DISCONTINUED | OUTPATIENT
Start: 2024-08-28 | End: 2024-08-28 | Stop reason: SURG

## 2024-08-28 RX ORDER — NALOXONE HYDROCHLORIDE 0.4 MG/ML
0.08 INJECTION, SOLUTION INTRAMUSCULAR; INTRAVENOUS; SUBCUTANEOUS ONCE AS NEEDED
Status: DISCONTINUED | OUTPATIENT
Start: 2024-08-28 | End: 2024-08-28

## 2024-08-28 RX ADMIN — LIDOCAINE HYDROCHLORIDE 50 MG: 10 INJECTION, SOLUTION EPIDURAL; INFILTRATION; INTRACAUDAL; PERINEURAL at 07:35:00

## 2024-08-28 NOTE — DISCHARGE INSTRUCTIONS
Home Care Instructions for Colonoscopy with Sedation    Diet:  - Resume your regular diet as tolerated unless otherwise instructed.  - Start with light meals to minimize bloating.  - Do not drink alcohol today.    Medication:  - If you have questions about resuming your normal medications, please contact your Primary Care Physician.    Activities:  - Take it easy today. Do not return to work today.  - Do not drive today.  - Do not operate any machinery today (including kitchen equipment).    Colonoscopy:  - You may notice some rectal \"spotting\" (a little blood on the toilet tissue) for a day or two after the exam. This is normal.  - If you experience any rectal bleeding (not spotting), persistent tenderness or sharp severe abdominal pains, oral temperature over 100 degrees Fahrenheit, light-headedness or dizziness, or any other problems, contact your doctor.    **If unable to reach your doctor, please go to the [facility] Emergency Room**    - Your referring physician will receive a full report of your examination.  - If you do not hear from your doctor's office within two weeks of your biopsy, please call them for your results.    You may be able to see your laboratory results in [employer] between 4 and 7 business days.  In some cases, your physician may not have viewed the results before they are released to [employer].  If you have questions regarding your results contact the physician who ordered the test/exam by phone or via [employer] by choosing \"Ask a Medical Question.\"

## 2024-08-28 NOTE — BRIEF OP NOTE
Pre-Operative Diagnosis: Encounter for screening colonoscopy [Z12.11]     Post-Operative Diagnosis: normal      Procedure Performed:   COLONOSCOPY    Surgeons and Role:     * El Willingham DO - Primary    Assistant(s):        Surgical Findings:      Specimen:      Estimated Blood Loss: No data recorded    Dictation Number:      El Willingham DO  8/28/2024  10:07 AM

## 2024-08-28 NOTE — ANESTHESIA POSTPROCEDURE EVALUATION
[facility]    Augustina Baugh Patient Status:  Hospital Outpatient Surgery   Age/Gender 59 year old female MRN [id number]   Location [facility] Attending El Willingham DO   Hosp Day # 0 PCP Lm Rosales MD       Anesthesia Post-op Note    COLONOSCOPY    Procedure Summary       Date: 08/28/24 Room / Location:  ENDOSCOPY 04 /  ENDOSCOPY    Anesthesia Start: 0734 Anesthesia Stop:     Procedure: COLONOSCOPY Diagnosis:       Encounter for screening colonoscopy      (normal)    Surgeons: El Willingham DO Anesthesiologist: Beto Patterson MD    Anesthesia Type: MAC ASA Status: 2            Anesthesia Type: MAC    Vitals Value Taken Time   /74 08/28/24 0809   Temp  08/28/24 0810   Pulse 73 08/28/24 0809   Resp 16 08/28/24 0809   SpO2 100 % 08/28/24 0809       Patient Location: Endoscopy    Anesthesia Type: MAC    Airway Patency: patent    Postop Pain Control: adequate    Mental Status: preanesthetic baseline    Nausea/Vomiting: none    Cardiopulmonary/Hydration status: stable euvolemic    Complications: no apparent anesthesia related complications    Postop vital signs: stable    Dental Exam: Unchanged from Preop    Patient to be discharged home when criteria met.

## 2024-08-28 NOTE — ANESTHESIA PREPROCEDURE EVALUATION
PRE-OP EVALUATION    Patient Name: Augustina Baugh    Admit Diagnosis: Encounter for screening colonoscopy [Z12.11]    Pre-op Diagnosis: Encounter for screening colonoscopy [Z12.11]    COLONOSCOPY    Anesthesia Procedure: COLONOSCOPY    Surgeons and Role:     * El Willingham, DO - Primary    Pre-op vitals reviewed.  Temp: 98 °F (36.7 °C)  Pulse: 70  Resp: 16  BP: 131/60  SpO2: 99 %  Body mass index is 29.45 kg/m².    Current medications reviewed.  Hospital Medications:   lactated ringers infusion   Intravenous Continuous       Outpatient Medications:     Medications Prior to Admission   Medication Sig Dispense Refill Last Dose    acetaminophen-codeine (TYLENOL WITH CODEINE #3) 300-30 MG Oral Tab Take 1 tablet by mouth every 4 (four) hours as needed for Pain. 30 tablet 0 Past Month    gabapentin 100 MG Oral Cap Take 1 capsule (100 mg total) by mouth 3 (three) times daily.   Past Week    ascorbic acid 500 MG Oral Tab Take 1 tablet (500 mg total) by mouth 3 (three) times daily.   Past Week    HYDROcodone-acetaminophen 5-325 MG Oral Tab Take 1 tablet by mouth.   Past Month    ibuprofen 800 MG Oral Tab Take 1 tablet (800 mg total) by mouth.   Past Month    vitamin E 1000 UNITS Oral Cap Take 1 capsule (1,000 Units total) by mouth.   Past Week       Allergies: Patient has no known allergies.      Anesthesia Evaluation    Patient summary reviewed.    Anesthetic Complications           GI/Hepatic/Renal    Negative GI/hepatic/renal ROS.                             Cardiovascular    Negative cardiovascular ROS.  ECG reviewed.  Exercise tolerance: good     MET: >4                                           Endo/Other    Negative endo/other ROS.                              Pulmonary    Negative pulmonary ROS.                       Neuro/Psych    Negative neuro/psych ROS.                                  Past Surgical History:   Procedure Laterality Date    Hysterectomy       Social History     Socioeconomic History    Marital  status:    Tobacco Use    Smoking status: Never    Smokeless tobacco: Never   Vaping Use    Vaping status: Never Used   Substance and Sexual Activity    Alcohol use: Not Currently    Drug use: Never    Sexual activity: Not Currently     History   Drug Use Unknown     Available pre-op labs reviewed.               Airway      Mallampati: II  Mouth opening: >3 FB  TM distance: > 6 cm  Neck ROM: full Cardiovascular    Cardiovascular exam normal.  Rhythm: regular  Rate: normal     Dental    Dentition appears grossly intact         Pulmonary    Pulmonary exam normal.  Breath sounds clear to auscultation bilaterally.               Other findings              ASA: 2   Plan: MAC  NPO status verified and patient meets guidelines.    Post-procedure pain management plan discussed with surgeon and patient.      Plan/risks discussed with: patient                Present on Admission:  **None**

## 2024-08-28 NOTE — H&P
Patient presents for colonoscopy.  Patient states that she has never had a prior colonoscopy the history is per the patient's daughter.  She denies change in bowel habits denies chronic abdominal pain denies unexplained weight loss denies rectal bleeding she does claim constipation.  She takes Metamucil daily.         .            Past Medical History   No past medical history on file.     Past Surgical History   No past surgical history on file.     Medications Ordered Prior to Encounter          Current Outpatient Medications on File Prior to Visit   Medication Sig Dispense Refill    acetaminophen-codeine (TYLENOL WITH CODEINE #3) 300-30 MG Oral Tab Take 1 tablet by mouth every 4 (four) hours as needed for Pain. 30 tablet 0    gabapentin 100 MG Oral Cap          nabumetone 750 MG Oral Tab Take 1 tablet (750 mg total) by mouth 2 (two) times daily with meals.        valACYclovir 500 MG Oral Tab          ascorbic acid 500 MG Oral Tab Take 1 tablet (500 mg total) by mouth 3 (three) times daily.        HYDROcodone-acetaminophen 5-325 MG Oral Tab Take 1 tablet by mouth. (Patient not taking: Reported on 2/28/2024)        diclofenac 50 MG Oral Tab EC TK 1 T PO BID. SWALLOW WHOLE.        cyclobenzaprine 10 MG Oral Tab Take 1 tablet (10 mg total) by mouth nightly.        ibuprofen 800 MG Oral Tab Take 1 tablet (800 mg total) by mouth.        loperamide 2 MG Oral Cap Take 1 capsule (2 mg total) by mouth every 2 (two) hours as needed.        vitamin E 1000 UNITS Oral Cap Take 1 capsule (1,000 Units total) by mouth.          No current facility-administered medications on file prior to visit.         @ALL@  Family History   No family history on file.     Short Social Hx on File   Social History           Socioeconomic History    Marital status:    Tobacco Use    Smoking status: Never    Smokeless tobacco: Never   Vaping Use    Vaping Use: Never used   Substance and Sexual Activity    Alcohol use: Not Currently    Drug  use: Never    Sexual activity: Not Currently            ROS      GENERAL HEALTH: otherwise feels well, no weight loss, no fever or chills  SKIN: denies any unusual skin rashes or jaundice  HEENT: denies nasal congestion, sinus pain or sore throat; hearing loss negative,   EYES , no diplopia or vision changes  RESPIRATORY: denies shortness of breath, wheezing or cough   CARDIOVASCULAR: denies chest pain or KUMAR; no palpitations   GI: denies nausea, vomiting, constipation, diarrhea; no rectal bleeding; no heartburn  GENITAL/: no dysuria, urgency or frequency, no tea colored urine  MUSCULOSKELETAL: no joint complaints upper or lower extremities  HEMATOLOGY: denies hx anemia; denies bruising or excessive bleeding  Endocrine: no weight gain or loss no hot or cold intolerance     EXAM      Pulse 76, temperature 98 °F (36.7 °C), temperature source Temporal.  GENERAL: well developed, well nourished female, in no apparent distress.    MENTAL STATUS :Alert, oriented x 3  PSYCH: normal mood and affect  SKIN: anicteric, no rashes, no bruising  EYES: PERRLA, EOMI, sclera anicteric,  conjunctiva without pallor  HEENT: normocephalic, atraumatic, TMs clear, nares patent, mouth moist, pharynx w/o erythema  NECK: supple, no JVD, no adenopathy, no thyromegaly  RESPIRATORY: clear to auscultation, no rales , rhonchi or wheezing  CARDIOVASCULAR: RRR, murmur negative  ABDOMEN: normal active BS, soft, nondistended  no HSM, no masses or hernias  LYMPHATIC: no axillary , supraclavicular or inguinal lymphadenopathy  EXTREMITIES: no cyanosis, clubbing or edema, no atrophy, full ROM     STUDIES:              [facility] Encounter on 02/22/2024   Component Date Value Ref Range Status    Glucose 02/22/2024 94  70 - 99 mg/dL Final    Sodium 02/22/2024 139  136 - 145 mmol/L Final    Potassium 02/22/2024 3.7  3.5 - 5.1 mmol/L Final    Chloride 02/22/2024 109  98 - 112 mmol/L Final    CO2 02/22/2024 23.0  21.0 - 32.0 mmol/L Final    Anion Gap 02/22/2024  7  0 - 18 mmol/L Final    BUN 02/22/2024 19  9 - 23 mg/dL Final    Creatinine 02/22/2024 0.77  0.55 - 1.02 mg/dL Final    Calcium, Total 02/22/2024 9.2  8.5 - 10.1 mg/dL Final    Calculated Osmolality 02/22/2024 290  275 - 295 mOsm/kg Final    eGFR-Cr 02/22/2024 88  >=60 mL/min/1.73m2 Final    AST 02/22/2024 13 (L)  15 - 37 U/L Final    ALT 02/22/2024 18  13 - 56 U/L Final    Alkaline Phosphatase 02/22/2024 100  46 - 118 U/L Final    Bilirubin, Total 02/22/2024 0.5  0.1 - 2.0 mg/dL Final    Total Protein 02/22/2024 6.7  6.4 - 8.2 g/dL Final    Albumin 02/22/2024 3.8  3.4 - 5.0 g/dL Final    Globulin  02/22/2024 2.9  2.8 - 4.4 g/dL Final    A/G Ratio 02/22/2024 1.3  1.0 - 2.0 Final    Patient Fasting for CMP? 02/22/2024 Yes    Final    Cholesterol, Total 02/22/2024 190  <200 mg/dL Final     Desirable  <200 mg/dL  Borderline  200-239 mg/dL  High      >=240 mg/dL          HDL Cholesterol 02/22/2024 63 (H)  40 - 59 mg/dL Final     Interpretive Information:   An HDL cholesterol <40 mg/dL is low and constitutes a coronary heart disease risk factor. An HDL cholesterol >60 mg/dL is a negative risk factor for coronary heart disease.          Triglycerides 02/22/2024 153 (H)  30 - 149 mg/dL Final     Reference interval for fasting triglycerides  Desirable: <150 mg/dL  Borderline: 150-199 mg/dL  High: 200-499 mg/dL  Very High: >=500 mg/dL             LDL Cholesterol 02/22/2024 101 (H)  <100 mg/dL Final     Desirable <100 mg/dL   Borderline 100-129 mg/dL   High     >=130mg/dL          VLDL 02/22/2024 25  0 - 30 mg/dL Final    Non HDL Chol 02/22/2024 127  <130 mg/dL Final     Desirable  <130 mg/dL   Borderline  130-159 mg/dL   High        160-189 mg/dL       Very high >=190 mg/dL          Patient Fasting for Lipid? 02/22/2024 Yes    Final    TSH 02/22/2024 5.670 (H)  0.358 - 3.740 mIU/mL Final     This test may exhibit interference when a sample is collected from a person who is consuming high dose of biotin (a.k.a., vitamin B7,  vitamin H, coenzyme R) supplements resulting in serum concentrations >100 ng/mL.  Intake of the recommended daily allowance (RDA) for biotin (0.03 mg) has not been shown to typically cause significant interference; however, high dose daily dietary supplements may contain biotin concentrations greater than 150 times (5-10 mg) the RDA.  It is recommended that physicians ask all patients who may be on biotin supplementation to stop biotin consumption at least 72 hours prior to collection of a new sample.       Vitamin B12 02/22/2024 294  193 - 986 pg/mL Final     Vitamin B12 Reference Range      Deficient:      <150 pg/mL      Indeterminate   150-192 pg/mL      Normal:         193-986 pg/mL        This test may exhibit interference when a sample is collected from a person who is consuming high dose of biotin (a.k.a., vitamin B7, vitamin H, coenzyme R) supplements resulting in serum concentrations >100 ng/mL.  Intake of the recommended daily allowance (RDA) for biotin (0.03 mg) has not been shown to typically cause significant interference; however, high dose daily dietary supplements may contain biotin concentrations greater than 150 times (5-10 mg) the RDA.  It is recommended that physicians ask all patients who may be on biotin supplementation to stop biotin consumption at least 72 hours prior to collection of a new sample.       WBC 02/22/2024 3.3 (L)  4.0 - 11.0 x10(3) uL Final    RBC 02/22/2024 5.11  3.80 - 5.30 x10(6)uL Final    HGB 02/22/2024 12.5  12.0 - 16.0 g/dL Final    HCT 02/22/2024 40.4  35.0 - 48.0 % Final    PLT 02/22/2024 155.0  150.0 - 450.0 10(3)uL Final    MCV 02/22/2024 79.1 (L)  80.0 - 100.0 fL Final    MCH 02/22/2024 24.5 (L)  26.0 - 34.0 pg Final    MCHC 02/22/2024 30.9 (L)  31.0 - 37.0 g/dL Final    RDW 02/22/2024 13.6  % Final    Neutrophil Absolute Prelim 02/22/2024 1.82  1.50 - 7.70 x10 (3) uL Final    Neutrophil Absolute 02/22/2024 1.82  1.50 - 7.70 x10(3) uL Final    Lymphocyte Absolute  02/22/2024 0.91 (L)  1.00 - 4.00 x10(3) uL Final    Monocyte Absolute 02/22/2024 0.39  0.10 - 1.00 x10(3) uL Final    Eosinophil Absolute 02/22/2024 0.10  0.00 - 0.70 x10(3) uL Final    Basophil Absolute 02/22/2024 0.02  0.00 - 0.20 x10(3) uL Final    Immature Granulocyte Absolute 02/22/2024 0.01  0.00 - 1.00 x10(3) uL Final    Neutrophil % 02/22/2024 56.0  % Final    Lymphocyte % 02/22/2024 28.0  % Final    Monocyte % 02/22/2024 12.0  % Final    Eosinophil % 02/22/2024 3.1  % Final    Basophil % 02/22/2024 0.6  % Final    Immature Granulocyte % 02/22/2024 0.3  % Final    Vitamin D, 25OH, Total 02/22/2024 27.5 (L)  30.0 - 100.0 ng/mL Final     Literature Recommendations for 25(OH)D levels are:  Range           Vitamin D Status   <20    ng/mL      Deficiency   20-<30 ng/mL      Insufficiency    ng/mL      Sufficiency   >100   ng/mL      Toxicity     *Clinical controversy exists regarding optimal 25(OH)D levels. Emerging evidence links potential adverse effects to high levels, particularly >60 ng/mL.          Free T4 02/22/2024 1.3  0.8 - 1.7 ng/dL Final     If applicable: Pregnancy Reference Intervals  First trimester 10-13 weeks gestation    0.9-1.4 ng/dL  Second trimester 14-26 weeks gestation   0.7-1.3 ng/dL        This test may exhibit interference when a sample is collected from a person who is consuming high dose of biotin (a.k.a., vitamin B7, vitamin H, coenzyme R) supplements resulting in serum concentrations >100 ng/mL.  Intake of the recommended daily allowance (RDA) for biotin (0.03 mg) has not been shown to typically cause significant interference; however, high dose daily dietary supplements may contain biotin concentrations greater than 150 times (5-10 mg) the RDA.  It is recommended that physicians ask all patients who may be on biotin supplementation to stop biotin consumption at least 72 hours prior to collection of a new sample.            ASSESSMENT   Imp: Average risk screening colonoscopy,  constipation recommend patient take MiraLAX daily.  Discussed with patient and daughter risks of procedure to include bleeding and bowel perforation with surgery to repair  PLan:

## (undated) DEVICE — 1200CC GUARDIAN II: Brand: GUARDIAN

## (undated) DEVICE — 10FT COMBINED O2 DELIVERY/CO2 MONITORING. FILTER WITH MICROSTREAM TYPE LUER: Brand: DUAL ADULT NASAL CANNULA

## (undated) DEVICE — KIT VLV 5 PC AIR H2O SUCT BX ENDOGATOR CONN

## (undated) DEVICE — 3M™ RED DOT™ MONITORING ELECTRODE WITH FOAM TAPE AND STICKY GEL, 50/BAG, 20/CASE, 72/PLT 2570: Brand: RED DOT™

## (undated) DEVICE — KIT CUSTOM ENDOPROCEDURE STERIS

## (undated) DEVICE — KIT MFLD FOR SPEC COLL

## (undated) NOTE — LETTER
24    Patient: Augustina Baugh  : 1965 Visit date: 3/21/2024    Dear  Lm Rosales MD    Thank you for referring Augustina Baugh to my practice.  Please find my assessment and plan below.        Patient saw Gopal in the office.  She presents for screening colonoscopy, this is her first colonoscopy.  She is currently scheduled.  Thank you Juan Carlos  Sincerely,       El Willingham DO   CC:   No Recipients